# Patient Record
Sex: FEMALE | Race: OTHER | URBAN - METROPOLITAN AREA
[De-identification: names, ages, dates, MRNs, and addresses within clinical notes are randomized per-mention and may not be internally consistent; named-entity substitution may affect disease eponyms.]

---

## 2023-02-01 ENCOUNTER — LAB ENCOUNTER (OUTPATIENT)
Dept: LAB | Age: 33
End: 2023-02-01
Attending: PHYSICIAN ASSISTANT
Payer: COMMERCIAL

## 2023-02-01 ENCOUNTER — OFFICE VISIT (OUTPATIENT)
Dept: INTERNAL MEDICINE CLINIC | Facility: CLINIC | Age: 33
End: 2023-02-01

## 2023-02-01 VITALS
OXYGEN SATURATION: 99 % | DIASTOLIC BLOOD PRESSURE: 68 MMHG | BODY MASS INDEX: 26.22 KG/M2 | SYSTOLIC BLOOD PRESSURE: 90 MMHG | HEART RATE: 78 BPM | HEIGHT: 63 IN | WEIGHT: 148 LBS

## 2023-02-01 DIAGNOSIS — Z00.00 PHYSICAL EXAM: ICD-10-CM

## 2023-02-01 DIAGNOSIS — R53.83 OTHER FATIGUE: ICD-10-CM

## 2023-02-01 DIAGNOSIS — Z00.00 PHYSICAL EXAM: Primary | ICD-10-CM

## 2023-02-01 LAB
ALBUMIN SERPL-MCNC: 4.3 G/DL (ref 3.4–5)
ALBUMIN/GLOB SERPL: 1.2 {RATIO} (ref 1–2)
ALP LIVER SERPL-CCNC: 53 U/L
ALT SERPL-CCNC: 24 U/L
ANION GAP SERPL CALC-SCNC: 5 MMOL/L (ref 0–18)
AST SERPL-CCNC: 24 U/L (ref 15–37)
BASOPHILS # BLD AUTO: 0.06 X10(3) UL (ref 0–0.2)
BASOPHILS NFR BLD AUTO: 1 %
BILIRUB SERPL-MCNC: 0.7 MG/DL (ref 0.1–2)
BILIRUB UR QL: NEGATIVE
BUN BLD-MCNC: 13 MG/DL (ref 7–18)
BUN/CREAT SERPL: 14.1 (ref 10–20)
CALCIUM BLD-MCNC: 9.9 MG/DL (ref 8.5–10.1)
CHLORIDE SERPL-SCNC: 106 MMOL/L (ref 98–112)
CHOLEST SERPL-MCNC: 169 MG/DL (ref ?–200)
CO2 SERPL-SCNC: 29 MMOL/L (ref 21–32)
COLOR UR: YELLOW
CREAT BLD-MCNC: 0.92 MG/DL
DEPRECATED HBV CORE AB SER IA-ACNC: 27.9 NG/ML
DEPRECATED RDW RBC AUTO: 42.5 FL (ref 35.1–46.3)
EOSINOPHIL # BLD AUTO: 0.11 X10(3) UL (ref 0–0.7)
EOSINOPHIL NFR BLD AUTO: 1.9 %
ERYTHROCYTE [DISTWIDTH] IN BLOOD BY AUTOMATED COUNT: 12.6 % (ref 11–15)
FASTING PATIENT LIPID ANSWER: YES
FASTING STATUS PATIENT QL REPORTED: YES
GFR SERPLBLD BASED ON 1.73 SQ M-ARVRAT: 84 ML/MIN/1.73M2 (ref 60–?)
GLOBULIN PLAS-MCNC: 3.7 G/DL (ref 2.8–4.4)
GLUCOSE BLD-MCNC: 82 MG/DL (ref 70–99)
GLUCOSE UR-MCNC: NEGATIVE MG/DL
HCT VFR BLD AUTO: 44.9 %
HDLC SERPL-MCNC: 68 MG/DL (ref 40–59)
HGB BLD-MCNC: 15 G/DL
IMM GRANULOCYTES # BLD AUTO: 0.01 X10(3) UL (ref 0–1)
IMM GRANULOCYTES NFR BLD: 0.2 %
IRON SATN MFR SERPL: 19 %
IRON SERPL-MCNC: 76 UG/DL
KETONES UR-MCNC: NEGATIVE MG/DL
LDLC SERPL CALC-MCNC: 91 MG/DL (ref ?–100)
LEUKOCYTE ESTERASE UR QL STRIP.AUTO: NEGATIVE
LYMPHOCYTES # BLD AUTO: 2.2 X10(3) UL (ref 1–4)
LYMPHOCYTES NFR BLD AUTO: 38.3 %
MCH RBC QN AUTO: 30.6 PG (ref 26–34)
MCHC RBC AUTO-ENTMCNC: 33.4 G/DL (ref 31–37)
MCV RBC AUTO: 91.6 FL
MONOCYTES # BLD AUTO: 0.44 X10(3) UL (ref 0.1–1)
MONOCYTES NFR BLD AUTO: 7.7 %
NEUTROPHILS # BLD AUTO: 2.92 X10 (3) UL (ref 1.5–7.7)
NEUTROPHILS # BLD AUTO: 2.92 X10(3) UL (ref 1.5–7.7)
NEUTROPHILS NFR BLD AUTO: 50.9 %
NITRITE UR QL STRIP.AUTO: NEGATIVE
NONHDLC SERPL-MCNC: 101 MG/DL (ref ?–130)
OSMOLALITY SERPL CALC.SUM OF ELEC: 289 MOSM/KG (ref 275–295)
PH UR: 6 [PH] (ref 5–8)
PLATELET # BLD AUTO: 277 10(3)UL (ref 150–450)
POTASSIUM SERPL-SCNC: 4.6 MMOL/L (ref 3.5–5.1)
PROT SERPL-MCNC: 8 G/DL (ref 6.4–8.2)
PROT UR-MCNC: 30 MG/DL
RBC # BLD AUTO: 4.9 X10(6)UL
SODIUM SERPL-SCNC: 140 MMOL/L (ref 136–145)
SP GR UR STRIP: 1.02 (ref 1–1.03)
TIBC SERPL-MCNC: 390 UG/DL (ref 240–450)
TRANSFERRIN SERPL-MCNC: 262 MG/DL (ref 200–360)
TRIGL SERPL-MCNC: 48 MG/DL (ref 30–149)
TSI SER-ACNC: 1.37 MIU/ML (ref 0.36–3.74)
UROBILINOGEN UR STRIP-ACNC: <2
VIT C UR-MCNC: NEGATIVE MG/DL
VIT D+METAB SERPL-MCNC: 31.8 NG/ML (ref 30–100)
VLDLC SERPL CALC-MCNC: 8 MG/DL (ref 0–30)
WBC # BLD AUTO: 5.7 X10(3) UL (ref 4–11)

## 2023-02-01 PROCEDURE — 99385 PREV VISIT NEW AGE 18-39: CPT | Performed by: PHYSICIAN ASSISTANT

## 2023-02-01 PROCEDURE — 3008F BODY MASS INDEX DOCD: CPT | Performed by: PHYSICIAN ASSISTANT

## 2023-02-01 PROCEDURE — 80061 LIPID PANEL: CPT

## 2023-02-01 PROCEDURE — 85025 COMPLETE CBC W/AUTO DIFF WBC: CPT

## 2023-02-01 PROCEDURE — 82728 ASSAY OF FERRITIN: CPT

## 2023-02-01 PROCEDURE — 83540 ASSAY OF IRON: CPT

## 2023-02-01 PROCEDURE — 81001 URINALYSIS AUTO W/SCOPE: CPT

## 2023-02-01 PROCEDURE — 3074F SYST BP LT 130 MM HG: CPT | Performed by: PHYSICIAN ASSISTANT

## 2023-02-01 PROCEDURE — 84443 ASSAY THYROID STIM HORMONE: CPT

## 2023-02-01 PROCEDURE — 84466 ASSAY OF TRANSFERRIN: CPT

## 2023-02-01 PROCEDURE — 80053 COMPREHEN METABOLIC PANEL: CPT

## 2023-02-01 PROCEDURE — 36415 COLL VENOUS BLD VENIPUNCTURE: CPT

## 2023-02-01 PROCEDURE — 3078F DIAST BP <80 MM HG: CPT | Performed by: PHYSICIAN ASSISTANT

## 2023-02-01 PROCEDURE — 82306 VITAMIN D 25 HYDROXY: CPT

## 2023-02-01 RX ORDER — BUSPIRONE HYDROCHLORIDE 5 MG/1
5 TABLET ORAL 3 TIMES DAILY
COMMUNITY
Start: 2022-09-28

## 2023-02-01 RX ORDER — BUSPIRONE HYDROCHLORIDE 15 MG/1
TABLET ORAL
COMMUNITY
Start: 2023-01-30

## 2023-02-03 ENCOUNTER — PATIENT MESSAGE (OUTPATIENT)
Dept: INTERNAL MEDICINE CLINIC | Facility: CLINIC | Age: 33
End: 2023-02-03

## 2023-02-03 ENCOUNTER — OFFICE VISIT (OUTPATIENT)
Dept: DERMATOLOGY CLINIC | Facility: CLINIC | Age: 33
End: 2023-02-03

## 2023-02-03 DIAGNOSIS — D23.9 DERMATOFIBROMA: Primary | ICD-10-CM

## 2023-02-03 DIAGNOSIS — D23.9 BENIGN NEOPLASM OF SKIN, UNSPECIFIED LOCATION: ICD-10-CM

## 2023-02-03 DIAGNOSIS — L21.9 SEBORRHEIC DERMATITIS: ICD-10-CM

## 2023-02-03 DIAGNOSIS — D22.9 MULTIPLE NEVI: ICD-10-CM

## 2023-02-03 PROCEDURE — 99203 OFFICE O/P NEW LOW 30 MIN: CPT | Performed by: DERMATOLOGY

## 2023-02-03 RX ORDER — KETOCONAZOLE 20 MG/ML
SHAMPOO TOPICAL
Qty: 120 ML | Refills: 11 | Status: SHIPPED | OUTPATIENT
Start: 2023-02-03

## 2023-02-03 RX ORDER — MULTIVITAMIN
1 TABLET ORAL DAILY
COMMUNITY

## 2023-02-03 NOTE — TELEPHONE ENCOUNTER
From: Alisha Tabares  To: Miladis Angeles PA-C  Sent: 2/3/2023 11:08 AM CST  Subject: test results    Hi Skye,     I saw a few things come up on my urine sample. I am guessing it's a non issue but wanted to double check on all of my test results. Thank you!    Giovanni Rater

## 2023-02-06 DIAGNOSIS — R80.9 PROTEINURIA, UNSPECIFIED TYPE: Primary | ICD-10-CM

## 2023-02-20 ENCOUNTER — PATIENT MESSAGE (OUTPATIENT)
Dept: INTERNAL MEDICINE CLINIC | Facility: CLINIC | Age: 33
End: 2023-02-20

## 2023-02-20 DIAGNOSIS — R82.81 PYURIA: Primary | ICD-10-CM

## 2023-02-22 ENCOUNTER — LAB ENCOUNTER (OUTPATIENT)
Dept: LAB | Age: 33
End: 2023-02-22
Attending: PHYSICIAN ASSISTANT
Payer: COMMERCIAL

## 2023-02-22 DIAGNOSIS — R80.9 PROTEINURIA, UNSPECIFIED TYPE: ICD-10-CM

## 2023-02-22 LAB
BILIRUB UR QL: NEGATIVE
CLARITY UR: CLEAR
GLUCOSE UR-MCNC: NORMAL MG/DL
HGB UR QL STRIP.AUTO: NEGATIVE
KETONES UR-MCNC: NEGATIVE MG/DL
LEUKOCYTE ESTERASE UR QL STRIP.AUTO: 25
NITRITE UR QL STRIP.AUTO: NEGATIVE
PH UR: 7 [PH] (ref 5–8)
PROT UR-MCNC: NEGATIVE MG/DL
SP GR UR STRIP: 1.01 (ref 1–1.03)
UROBILINOGEN UR STRIP-ACNC: NORMAL

## 2023-02-22 PROCEDURE — 81001 URINALYSIS AUTO W/SCOPE: CPT

## 2023-02-24 ENCOUNTER — PATIENT MESSAGE (OUTPATIENT)
Dept: INTERNAL MEDICINE CLINIC | Facility: CLINIC | Age: 33
End: 2023-02-24

## 2023-02-24 NOTE — TELEPHONE ENCOUNTER
I can order a UA/CS for pt to have done, she should stay well hydrated.  If shes late for her cycle, I recommend taking a pregnancy test

## 2023-03-01 ENCOUNTER — MED REC SCAN ONLY (OUTPATIENT)
Dept: INTERNAL MEDICINE CLINIC | Facility: CLINIC | Age: 33
End: 2023-03-01

## 2023-03-01 ENCOUNTER — OFFICE VISIT (OUTPATIENT)
Dept: INTERNAL MEDICINE CLINIC | Facility: CLINIC | Age: 33
End: 2023-03-01

## 2023-03-01 ENCOUNTER — LAB ENCOUNTER (OUTPATIENT)
Dept: LAB | Age: 33
End: 2023-03-01
Attending: PHYSICIAN ASSISTANT
Payer: COMMERCIAL

## 2023-03-01 VITALS
BODY MASS INDEX: 26.4 KG/M2 | TEMPERATURE: 98 F | HEIGHT: 63 IN | HEART RATE: 64 BPM | WEIGHT: 149 LBS | OXYGEN SATURATION: 98 % | DIASTOLIC BLOOD PRESSURE: 70 MMHG | SYSTOLIC BLOOD PRESSURE: 110 MMHG

## 2023-03-01 DIAGNOSIS — R53.83 OTHER FATIGUE: ICD-10-CM

## 2023-03-01 DIAGNOSIS — R53.83 OTHER FATIGUE: Primary | ICD-10-CM

## 2023-03-01 LAB
BILIRUB UR QL: NEGATIVE
CLARITY UR: CLEAR
COLOR UR: YELLOW
CRP SERPL-MCNC: <0.29 MG/DL (ref ?–0.3)
ERYTHROCYTE [SEDIMENTATION RATE] IN BLOOD: 2 MM/HR
GLUCOSE UR-MCNC: NEGATIVE MG/DL
LEUKOCYTE ESTERASE UR QL STRIP.AUTO: NEGATIVE
NITRITE UR QL STRIP.AUTO: NEGATIVE
PH UR: 7 [PH] (ref 5–8)
PROT UR-MCNC: NEGATIVE MG/DL
SP GR UR STRIP: 1.01 (ref 1–1.03)
T3FREE SERPL-MCNC: 2.46 PG/ML (ref 2.4–4.2)
T4 SERPL-MCNC: 7.1 UG/DL
THYROPEROXIDASE AB SERPL-ACNC: 494 U/ML (ref ?–60)
TSI SER-ACNC: 1.57 MIU/ML (ref 0.36–3.74)
UROBILINOGEN UR STRIP-ACNC: <2
VIT C UR-MCNC: NEGATIVE MG/DL

## 2023-03-01 PROCEDURE — 86038 ANTINUCLEAR ANTIBODIES: CPT

## 2023-03-01 PROCEDURE — 84436 ASSAY OF TOTAL THYROXINE: CPT

## 2023-03-01 PROCEDURE — 84481 FREE ASSAY (FT-3): CPT

## 2023-03-01 PROCEDURE — 3008F BODY MASS INDEX DOCD: CPT | Performed by: PHYSICIAN ASSISTANT

## 2023-03-01 PROCEDURE — 3074F SYST BP LT 130 MM HG: CPT | Performed by: PHYSICIAN ASSISTANT

## 2023-03-01 PROCEDURE — 36415 COLL VENOUS BLD VENIPUNCTURE: CPT

## 2023-03-01 PROCEDURE — 81001 URINALYSIS AUTO W/SCOPE: CPT | Performed by: PHYSICIAN ASSISTANT

## 2023-03-01 PROCEDURE — 86140 C-REACTIVE PROTEIN: CPT

## 2023-03-01 PROCEDURE — 85652 RBC SED RATE AUTOMATED: CPT

## 2023-03-01 PROCEDURE — 86225 DNA ANTIBODY NATIVE: CPT

## 2023-03-01 PROCEDURE — 84443 ASSAY THYROID STIM HORMONE: CPT

## 2023-03-01 PROCEDURE — 86376 MICROSOMAL ANTIBODY EACH: CPT

## 2023-03-01 PROCEDURE — 3078F DIAST BP <80 MM HG: CPT | Performed by: PHYSICIAN ASSISTANT

## 2023-03-01 PROCEDURE — 99214 OFFICE O/P EST MOD 30 MIN: CPT | Performed by: PHYSICIAN ASSISTANT

## 2023-03-02 LAB
DSDNA IGG SERPL IA-ACNC: <0.6 IU/ML
ENA AB SER QL IA: 0.1 UG/L
ENA AB SER QL IA: NEGATIVE

## 2023-03-06 ENCOUNTER — TELEPHONE (OUTPATIENT)
Dept: INTERNAL MEDICINE CLINIC | Facility: CLINIC | Age: 33
End: 2023-03-06

## 2023-03-06 NOTE — TELEPHONE ENCOUNTER
Skye Maynard, please advise    Patient is calling regarding lab test results completed on 3/1/23. Per chart review, PCP has not reviewed.   Last OV 3/1/23

## 2023-04-11 PROBLEM — F41.9 ANXIETY: Status: ACTIVE | Noted: 2023-04-11

## 2023-04-11 PROBLEM — R61 NIGHT SWEATS: Status: ACTIVE | Noted: 2023-04-11

## 2023-04-11 PROBLEM — R76.8 THYROID ANTIBODY POSITIVE: Status: ACTIVE | Noted: 2023-04-11

## 2023-04-20 ENCOUNTER — PATIENT MESSAGE (OUTPATIENT)
Dept: ENDOCRINOLOGY CLINIC | Facility: CLINIC | Age: 33
End: 2023-04-20

## 2023-04-21 ENCOUNTER — OFFICE VISIT (OUTPATIENT)
Dept: FAMILY MEDICINE CLINIC | Facility: CLINIC | Age: 33
End: 2023-04-21
Payer: COMMERCIAL

## 2023-04-21 VITALS
SYSTOLIC BLOOD PRESSURE: 114 MMHG | OXYGEN SATURATION: 98 % | TEMPERATURE: 98 F | RESPIRATION RATE: 18 BRPM | HEART RATE: 67 BPM | WEIGHT: 140 LBS | BODY MASS INDEX: 23.9 KG/M2 | HEIGHT: 64 IN | DIASTOLIC BLOOD PRESSURE: 70 MMHG

## 2023-04-21 DIAGNOSIS — J02.9 SORE THROAT: Primary | ICD-10-CM

## 2023-04-21 LAB
CONTROL LINE PRESENT WITH A CLEAR BACKGROUND (YES/NO): YES YES/NO
KIT LOT #: NORMAL NUMERIC
STREP GRP A CUL-SCR: NEGATIVE

## 2023-04-21 PROCEDURE — 99202 OFFICE O/P NEW SF 15 MIN: CPT | Performed by: NURSE PRACTITIONER

## 2023-04-21 PROCEDURE — 3008F BODY MASS INDEX DOCD: CPT | Performed by: NURSE PRACTITIONER

## 2023-04-21 PROCEDURE — 87147 CULTURE TYPE IMMUNOLOGIC: CPT | Performed by: NURSE PRACTITIONER

## 2023-04-21 PROCEDURE — 87081 CULTURE SCREEN ONLY: CPT | Performed by: NURSE PRACTITIONER

## 2023-04-21 PROCEDURE — 3074F SYST BP LT 130 MM HG: CPT | Performed by: NURSE PRACTITIONER

## 2023-04-21 PROCEDURE — 87880 STREP A ASSAY W/OPTIC: CPT | Performed by: NURSE PRACTITIONER

## 2023-04-21 PROCEDURE — 3078F DIAST BP <80 MM HG: CPT | Performed by: NURSE PRACTITIONER

## 2023-04-25 NOTE — TELEPHONE ENCOUNTER
Dr. Deo Block    Please advise    Component      Latest Ref Rng 4/11/2023   T4,Free (Direct)      0.8 - 1.7 ng/dL 1.0    TSH      0.358 - 3.740 mIU/mL 1.610    NORMETANEPHRINE      0.0 - 210.1 pg/mL 99.0    METANEPHRINE      0.0 - 88.0 pg/mL 57.5    Cortisol      ug/dL 7.5    ACTH      7.2 - 63.3 pg/mL 20.0    Estradiol      No established range for female sex pg/mL 61.5    FSH      No established range for female sex mIU/mL 9.1

## 2023-04-25 NOTE — TELEPHONE ENCOUNTER
From: Francisco J Wray  To: Audra Cotton MD  Sent: 4/20/2023 7:37 PM CDT  Subject: Test Results    Hi there - checking in on my test results. Thank you!

## 2023-07-17 ENCOUNTER — OFFICE VISIT (OUTPATIENT)
Dept: INTERNAL MEDICINE CLINIC | Facility: CLINIC | Age: 33
End: 2023-07-17

## 2023-07-17 ENCOUNTER — PATIENT MESSAGE (OUTPATIENT)
Dept: INTERNAL MEDICINE CLINIC | Facility: CLINIC | Age: 33
End: 2023-07-17

## 2023-07-17 VITALS
HEART RATE: 71 BPM | DIASTOLIC BLOOD PRESSURE: 65 MMHG | HEIGHT: 64 IN | BODY MASS INDEX: 24.72 KG/M2 | SYSTOLIC BLOOD PRESSURE: 100 MMHG | WEIGHT: 144.81 LBS

## 2023-07-17 DIAGNOSIS — F90.2 ATTENTION DEFICIT HYPERACTIVITY DISORDER (ADHD), COMBINED TYPE: ICD-10-CM

## 2023-07-17 DIAGNOSIS — R41.840 ATTENTION AND CONCENTRATION DEFICIT: ICD-10-CM

## 2023-07-17 DIAGNOSIS — R41.840 ATTENTION AND CONCENTRATION DEFICIT: Primary | ICD-10-CM

## 2023-07-17 PROBLEM — F90.9 ADHD: Status: ACTIVE | Noted: 2023-07-17

## 2023-07-17 PROCEDURE — 99213 OFFICE O/P EST LOW 20 MIN: CPT | Performed by: NURSE PRACTITIONER

## 2023-07-17 PROCEDURE — 3078F DIAST BP <80 MM HG: CPT | Performed by: NURSE PRACTITIONER

## 2023-07-17 PROCEDURE — 3008F BODY MASS INDEX DOCD: CPT | Performed by: NURSE PRACTITIONER

## 2023-07-17 PROCEDURE — 3074F SYST BP LT 130 MM HG: CPT | Performed by: NURSE PRACTITIONER

## 2023-07-17 RX ORDER — DEXTROAMPHETAMINE SACCHARATE, AMPHETAMINE ASPARTATE MONOHYDRATE, DEXTROAMPHETAMINE SULFATE AND AMPHETAMINE SULFATE 3.75; 3.75; 3.75; 3.75 MG/1; MG/1; MG/1; MG/1
15 CAPSULE, EXTENDED RELEASE ORAL DAILY
Qty: 30 CAPSULE | Refills: 0 | Status: SHIPPED | OUTPATIENT
Start: 2023-07-17 | End: 2023-08-16

## 2023-07-17 RX ORDER — DEXTROAMPHETAMINE SACCHARATE, AMPHETAMINE ASPARTATE MONOHYDRATE, DEXTROAMPHETAMINE SULFATE AND AMPHETAMINE SULFATE 3.75; 3.75; 3.75; 3.75 MG/1; MG/1; MG/1; MG/1
15 CAPSULE, EXTENDED RELEASE ORAL DAILY
Qty: 30 CAPSULE | Refills: 0 | Status: SHIPPED | OUTPATIENT
Start: 2023-09-17 | End: 2023-10-17

## 2023-07-17 RX ORDER — DEXTROAMPHETAMINE SACCHARATE, AMPHETAMINE ASPARTATE MONOHYDRATE, DEXTROAMPHETAMINE SULFATE AND AMPHETAMINE SULFATE 3.75; 3.75; 3.75; 3.75 MG/1; MG/1; MG/1; MG/1
15 CAPSULE, EXTENDED RELEASE ORAL DAILY
Qty: 30 CAPSULE | Refills: 0 | Status: SHIPPED | OUTPATIENT
Start: 2023-08-17 | End: 2023-09-16

## 2023-07-17 NOTE — ASSESSMENT & PLAN NOTE
Patient with a history of ADHD. She is feeling mentally exhausted    She is requesting to get back on the Adderall. Plan  Amphetamine-Dextroamphet ER (ADDERALL XR) 15 MG Oral Capsule SR 24 Hr Take 1 capsule (15 mg total) by mouth daily. 30 capsule 0   [START ON 8/17/2023] Amphetamine-Dextroamphet ER (ADDERALL XR) 15 MG Oral Capsule SR 24 Hr Take 1 capsule (15 mg total) by mouth daily. Fill prescription in 30 days. 30 capsule 0   [START ON 9/17/2023] Amphetamine-Dextroamphet ER (ADDERALL XR) 15 MG Oral Capsule SR 24 Hr Take 1 capsule (15 mg total) by mouth daily. Fill prescription in 61 days.  30 capsule 0

## 2023-07-18 ENCOUNTER — TELEPHONE (OUTPATIENT)
Dept: DERMATOLOGY CLINIC | Facility: CLINIC | Age: 33
End: 2023-07-18

## 2023-07-18 ENCOUNTER — OFFICE VISIT (OUTPATIENT)
Dept: DERMATOLOGY CLINIC | Facility: CLINIC | Age: 33
End: 2023-07-18

## 2023-07-18 DIAGNOSIS — L70.0 ACNE VULGARIS: Primary | ICD-10-CM

## 2023-07-18 PROCEDURE — 99213 OFFICE O/P EST LOW 20 MIN: CPT | Performed by: PHYSICIAN ASSISTANT

## 2023-07-18 RX ORDER — DEXTROAMPHETAMINE SACCHARATE, AMPHETAMINE ASPARTATE MONOHYDRATE, DEXTROAMPHETAMINE SULFATE AND AMPHETAMINE SULFATE 3.75; 3.75; 3.75; 3.75 MG/1; MG/1; MG/1; MG/1
15 CAPSULE, EXTENDED RELEASE ORAL DAILY
Qty: 30 CAPSULE | Refills: 0
Start: 2023-09-17 | End: 2023-10-17

## 2023-07-18 RX ORDER — DEXTROAMPHETAMINE SACCHARATE, AMPHETAMINE ASPARTATE MONOHYDRATE, DEXTROAMPHETAMINE SULFATE AND AMPHETAMINE SULFATE 3.75; 3.75; 3.75; 3.75 MG/1; MG/1; MG/1; MG/1
15 CAPSULE, EXTENDED RELEASE ORAL DAILY
Qty: 30 CAPSULE | Refills: 0
Start: 2023-07-18 | End: 2023-08-17

## 2023-07-18 RX ORDER — DEXTROAMPHETAMINE SACCHARATE, AMPHETAMINE ASPARTATE MONOHYDRATE, DEXTROAMPHETAMINE SULFATE AND AMPHETAMINE SULFATE 3.75; 3.75; 3.75; 3.75 MG/1; MG/1; MG/1; MG/1
15 CAPSULE, EXTENDED RELEASE ORAL DAILY
Qty: 30 CAPSULE | Refills: 0
Start: 2023-08-17 | End: 2023-09-16

## 2023-07-18 RX ORDER — CLINDAMYCIN AND BENZOYL PEROXIDE 10; 50 MG/G; MG/G
GEL TOPICAL
Qty: 50 G | Refills: 3 | Status: SHIPPED | OUTPATIENT
Start: 2023-07-18

## 2023-07-18 NOTE — TELEPHONE ENCOUNTER
From: Rafael Mcdonald  To: CARLENE Clark  Sent: 7/17/2023 2:04 PM CDT  Subject: Adderall Prescription Pharmacy Not able to fill     Hi there - my pharmacy does not have Adderall in stock and might not get any until early August. Can we have this prescription please sent to the [facility] across the street instead? The address is: [address], [facility], [place] for the trouble. Thank you!

## 2023-07-18 NOTE — TELEPHONE ENCOUNTER
[facility] called    Asking to change rx.  2% benzoyl peroxide Please call      Clindamycin Phos-Benzoyl Perox 1-5 % External Gel

## 2023-07-18 NOTE — PROGRESS NOTES
HPI:    Patient ID: Darby Nuñez is a 28year old female. Patient presents for follow-up visit. Upper back still having breakouts of acne. Increased aldactone to 100 mg and would like to discuss treatment. No draining or tenderness noted. Hx of allergies to medications noted. She has had acne issues the past few months. She is able to tolerate the 100 mg at least for the past week. Review of Systems   Constitutional:  Negative for chills and fever. Musculoskeletal:  Negative for arthralgias and myalgias. Skin:  Positive for rash. Negative for color change and wound. Current Outpatient Medications   Medication Sig Dispense Refill    Amphetamine-Dextroamphet ER (ADDERALL XR) 15 MG Oral Capsule SR 24 Hr Take 1 capsule (15 mg total) by mouth daily. 30 capsule 0    [START ON 8/17/2023] Amphetamine-Dextroamphet ER (ADDERALL XR) 15 MG Oral Capsule SR 24 Hr Take 1 capsule (15 mg total) by mouth daily. Fill prescription in 30 days. 30 capsule 0    [START ON 9/17/2023] Amphetamine-Dextroamphet ER (ADDERALL XR) 15 MG Oral Capsule SR 24 Hr Take 1 capsule (15 mg total) by mouth daily. Fill prescription in 61 days. 30 capsule 0    spironolactone 25 MG Oral Tab 1 tablet p.o. daily X 3 days, 2 p.o. daily X 3 days, 3 p.o. daily 90 tablet 3    Multiple Vitamin (ONE-DAILY MULTI VITAMINS) Oral Tab Take 1 tablet by mouth daily. ketoconazole 2 % External Shampoo Apply to scalp 2 times per week. 120 mL 11    busPIRone 15 MG Oral Tab        Allergies:  Sulfa Antibiotics       ANAPHYLAXIS, SWELLING    Comment:Throat closing   LMP 06/29/2023 (Exact Date)   There is no height or weight on file to calculate BMI. PHYSICAL EXAM:   Physical Exam  Constitutional:       General: She is not in acute distress. Appearance: Normal appearance. Skin:     General: Skin is warm and dry. Findings: Rash present. Comments: Papules noted on the back. No cystic lesions noted. No draining or tenderness noted. Slight erythema noted. Hyperpigmented areas noted on the face. Some scarring noted as well. Neurological:      Mental Status: She is alert and oriented to person, place, and time. ASSESSMENT/PLAN:   1. Acne vulgaris  -After discussion with patient, advised the following:  -Continue with aldactone 100 mg daily for 1 month to see if there is improvement  -Started tretinoin  _Educated to apply 2-3 times per day   -Started clindamycin-BPO   -Educated to apply daily in the morning.   -To call or follow-up with worsening symptoms or concerns.   -Return in 2-3 months to see if there is improvement.   -Pt was agreeable to plan and will comply with discussion above. No orders of the defined types were placed in this encounter.       Meds This Visit:  Requested Prescriptions      No prescriptions requested or ordered in this encounter       Imaging & Referrals:  None         EX#[id number]

## 2023-07-18 NOTE — TELEPHONE ENCOUNTER
Pharmacist asking to change it to Clindamycin 1.2%-5% so that pt's insurance will cover the medication.   Provided approval to the pharmacist.

## 2023-08-25 PROBLEM — U07.1 COVID-19 AFFECTING PREGNANCY IN FIRST TRIMESTER (HCC): Status: ACTIVE | Noted: 2022-01-24

## 2023-08-25 PROBLEM — O98.511 COVID-19 AFFECTING PREGNANCY IN FIRST TRIMESTER: Status: ACTIVE | Noted: 2022-01-24

## 2023-08-25 PROBLEM — U07.1 COVID-19 AFFECTING PREGNANCY IN FIRST TRIMESTER: Status: ACTIVE | Noted: 2022-01-24

## 2023-08-25 PROBLEM — O98.511 COVID-19 AFFECTING PREGNANCY IN FIRST TRIMESTER (HCC): Status: ACTIVE | Noted: 2022-01-24

## 2023-09-09 RX ORDER — SPIRONOLACTONE 25 MG/1
TABLET ORAL
Qty: 120 TABLET | Refills: 5 | Status: SHIPPED | OUTPATIENT
Start: 2023-09-09

## 2023-09-09 NOTE — TELEPHONE ENCOUNTER
Refill Request for medication(s): SPIRONOLACTONE 25 MG Oral Tab     Last Office Visit: 07/18/23    Last Refill: 05/11/23    Pharmacy, Dosage verified: [address] [facility] [id number] - [address], [address] [facility] [phone], [phone]    Condition Update (if applicable): Mycnura msg sent. Rx pended and sent to provider for approval, please advise. Thank You!

## 2023-09-21 ENCOUNTER — OFFICE VISIT (OUTPATIENT)
Dept: DERMATOLOGY CLINIC | Facility: CLINIC | Age: 33
End: 2023-09-21

## 2023-09-21 DIAGNOSIS — L70.0 ACNE VULGARIS: Primary | ICD-10-CM

## 2023-09-21 PROCEDURE — 99213 OFFICE O/P EST LOW 20 MIN: CPT | Performed by: PHYSICIAN ASSISTANT

## 2023-12-16 ENCOUNTER — PATIENT MESSAGE (OUTPATIENT)
Dept: INTERNAL MEDICINE CLINIC | Facility: CLINIC | Age: 33
End: 2023-12-16

## 2023-12-18 RX ORDER — DEXTROAMPHETAMINE SACCHARATE, AMPHETAMINE ASPARTATE MONOHYDRATE, DEXTROAMPHETAMINE SULFATE AND AMPHETAMINE SULFATE 3.75; 3.75; 3.75; 3.75 MG/1; MG/1; MG/1; MG/1
15 CAPSULE, EXTENDED RELEASE ORAL DAILY
Qty: 30 CAPSULE | Refills: 0 | Status: SHIPPED | OUTPATIENT
Start: 2023-12-18 | End: 2024-01-17

## 2023-12-18 NOTE — TELEPHONE ENCOUNTER
From: Denice Toney  To: Laura Hager  Sent: 12/16/2023 11:25 PM CST  Subject: Prescription refill    Hi there my adderal prescription refills has ended. Are you able to send refills to my pharmacy or do I need to come in for a follow up visit. Thank you!

## 2024-02-07 ENCOUNTER — LAB REQUISITION (OUTPATIENT)
Dept: LAB | Age: 34
End: 2024-02-07

## 2024-02-07 DIAGNOSIS — Z13.818 ENCOUNTER FOR SCREENING FOR OTHER DIGESTIVE SYSTEM DISORDERS: ICD-10-CM

## 2024-02-07 DIAGNOSIS — Z13.21 ENCOUNTER FOR SCREENING FOR NUTRITIONAL DISORDER: ICD-10-CM

## 2024-02-07 DIAGNOSIS — Z80.8 FAMILY HISTORY OF MALIGNANT NEOPLASM OF OTHER ORGANS OR SYSTEMS: ICD-10-CM

## 2024-02-07 DIAGNOSIS — Z83.3 FAMILY HISTORY OF DIABETES MELLITUS: ICD-10-CM

## 2024-02-07 DIAGNOSIS — F34.1 DYSTHYMIC DISORDER: ICD-10-CM

## 2024-02-07 DIAGNOSIS — N92.6 IRREGULAR MENSTRUATION, UNSPECIFIED: ICD-10-CM

## 2024-02-07 DIAGNOSIS — Z00.01 ENCOUNTER FOR GENERAL ADULT MEDICAL EXAMINATION WITH ABNORMAL FINDINGS: ICD-10-CM

## 2024-02-07 DIAGNOSIS — Z79.899 OTHER LONG TERM (CURRENT) DRUG THERAPY: ICD-10-CM

## 2024-02-07 DIAGNOSIS — Z13.220 ENCOUNTER FOR SCREENING FOR LIPOID DISORDERS: ICD-10-CM

## 2024-02-07 DIAGNOSIS — Z13.29 ENCOUNTER FOR SCREENING FOR OTHER SUSPECTED ENDOCRINE DISORDER: ICD-10-CM

## 2024-02-07 DIAGNOSIS — Z83.49 FAMILY HISTORY OF OTHER ENDOCRINE, NUTRITIONAL AND METABOLIC DISEASES: ICD-10-CM

## 2024-02-07 DIAGNOSIS — Z13.228 ENCOUNTER FOR SCREENING FOR OTHER METABOLIC DISORDERS: ICD-10-CM

## 2024-02-09 PROCEDURE — PSEU8135 LIPID PANEL WITH REFLEX: Performed by: CLINICAL MEDICAL LABORATORY

## 2024-02-09 PROCEDURE — PSEU8115 INSULIN LEVEL, FASTING: Performed by: CLINICAL MEDICAL LABORATORY

## 2024-02-09 PROCEDURE — 86376 MICROSOMAL ANTIBODY EACH: CPT | Performed by: CLINICAL MEDICAL LABORATORY

## 2024-02-09 PROCEDURE — PSEU10185 TESTOSTERONE, TOTAL, FREE AND BIOAVAILABLE, MALE: Performed by: CLINICAL MEDICAL LABORATORY

## 2024-02-09 PROCEDURE — 84439 ASSAY OF FREE THYROXINE: CPT | Performed by: CLINICAL MEDICAL LABORATORY

## 2024-02-09 PROCEDURE — PSEU8259 FERRITIN: Performed by: CLINICAL MEDICAL LABORATORY

## 2024-02-09 PROCEDURE — 84480 ASSAY TRIIODOTHYRONINE (T3): CPT | Performed by: CLINICAL MEDICAL LABORATORY

## 2024-02-09 PROCEDURE — 84630 ASSAY OF ZINC: CPT | Performed by: CLINICAL MEDICAL LABORATORY

## 2024-02-09 PROCEDURE — 83001 ASSAY OF GONADOTROPIN (FSH): CPT | Performed by: CLINICAL MEDICAL LABORATORY

## 2024-02-09 PROCEDURE — PSEU8258 ESTRADIOL: Performed by: CLINICAL MEDICAL LABORATORY

## 2024-02-09 PROCEDURE — 84481 FREE ASSAY (FT-3): CPT | Performed by: CLINICAL MEDICAL LABORATORY

## 2024-02-09 PROCEDURE — PSEU8303 URIC ACID: Performed by: CLINICAL MEDICAL LABORATORY

## 2024-02-09 PROCEDURE — 83735 ASSAY OF MAGNESIUM: CPT | Performed by: CLINICAL MEDICAL LABORATORY

## 2024-02-09 PROCEDURE — PSEU8187 CORTISOL: Performed by: CLINICAL MEDICAL LABORATORY

## 2024-02-09 PROCEDURE — 83036 HEMOGLOBIN GLYCOSYLATED A1C: CPT | Performed by: CLINICAL MEDICAL LABORATORY

## 2024-02-09 PROCEDURE — 83002 ASSAY OF GONADOTROPIN (LH): CPT | Performed by: CLINICAL MEDICAL LABORATORY

## 2024-02-09 PROCEDURE — 84403 ASSAY OF TOTAL TESTOSTERONE: CPT | Performed by: CLINICAL MEDICAL LABORATORY

## 2024-02-09 PROCEDURE — 82977 ASSAY OF GGT: CPT | Performed by: CLINICAL MEDICAL LABORATORY

## 2024-02-09 PROCEDURE — 82728 ASSAY OF FERRITIN: CPT | Performed by: CLINICAL MEDICAL LABORATORY

## 2024-02-09 PROCEDURE — 82627 DEHYDROEPIANDROSTERONE: CPT | Performed by: CLINICAL MEDICAL LABORATORY

## 2024-02-09 PROCEDURE — 83540 ASSAY OF IRON: CPT | Performed by: CLINICAL MEDICAL LABORATORY

## 2024-02-09 PROCEDURE — 80061 LIPID PANEL: CPT | Performed by: CLINICAL MEDICAL LABORATORY

## 2024-02-09 PROCEDURE — PSEU8904 ZINC: Performed by: CLINICAL MEDICAL LABORATORY

## 2024-02-09 PROCEDURE — 84550 ASSAY OF BLOOD/URIC ACID: CPT | Performed by: CLINICAL MEDICAL LABORATORY

## 2024-02-09 PROCEDURE — PSEU8088 FOLLICLE STIMULATING & LUTEINIZING HORMONES: Performed by: CLINICAL MEDICAL LABORATORY

## 2024-02-09 PROCEDURE — 84270 ASSAY OF SEX HORMONE GLOBUL: CPT | Performed by: CLINICAL MEDICAL LABORATORY

## 2024-02-09 PROCEDURE — PSEU8229 VITAMIN D -25 HYDROXY: Performed by: CLINICAL MEDICAL LABORATORY

## 2024-02-09 PROCEDURE — PSEU8223 T3, TOTAL: Performed by: CLINICAL MEDICAL LABORATORY

## 2024-02-09 PROCEDURE — 85025 COMPLETE CBC W/AUTO DIFF WBC: CPT | Performed by: CLINICAL MEDICAL LABORATORY

## 2024-02-09 PROCEDURE — 83550 IRON BINDING TEST: CPT | Performed by: CLINICAL MEDICAL LABORATORY

## 2024-02-09 PROCEDURE — 82306 VITAMIN D 25 HYDROXY: CPT | Performed by: CLINICAL MEDICAL LABORATORY

## 2024-02-09 PROCEDURE — PSEU8274 MAGNESIUM: Performed by: CLINICAL MEDICAL LABORATORY

## 2024-02-09 PROCEDURE — PSEU8262 FREE T4: Performed by: CLINICAL MEDICAL LABORATORY

## 2024-02-09 PROCEDURE — PSEU8191 DHEA SULFATE: Performed by: CLINICAL MEDICAL LABORATORY

## 2024-02-09 PROCEDURE — PSEU8263 GGT: Performed by: CLINICAL MEDICAL LABORATORY

## 2024-02-09 PROCEDURE — 80053 COMPREHEN METABOLIC PANEL: CPT | Performed by: CLINICAL MEDICAL LABORATORY

## 2024-02-09 PROCEDURE — PSEU8286 PROGESTERONE: Performed by: CLINICAL MEDICAL LABORATORY

## 2024-02-09 PROCEDURE — 82607 VITAMIN B-12: CPT | Performed by: CLINICAL MEDICAL LABORATORY

## 2024-02-09 PROCEDURE — PSEU8168 THYROID STIMULATING HORMONE REFLEX: Performed by: CLINICAL MEDICAL LABORATORY

## 2024-02-09 PROCEDURE — 86800 THYROGLOBULIN ANTIBODY: CPT | Performed by: CLINICAL MEDICAL LABORATORY

## 2024-02-09 PROCEDURE — 86141 C-REACTIVE PROTEIN HS: CPT | Performed by: CLINICAL MEDICAL LABORATORY

## 2024-02-09 PROCEDURE — PSEU8171 VITAMIN B12 AND FOLATE: Performed by: CLINICAL MEDICAL LABORATORY

## 2024-02-09 PROCEDURE — PSEU8556 THYROID ANTIBODIES: Performed by: CLINICAL MEDICAL LABORATORY

## 2024-02-09 PROCEDURE — PSEU8266 GLYCOHEMOGLOBIN: Performed by: CLINICAL MEDICAL LABORATORY

## 2024-02-09 PROCEDURE — 84144 ASSAY OF PROGESTERONE: CPT | Performed by: CLINICAL MEDICAL LABORATORY

## 2024-02-09 PROCEDURE — PSEU8506 C REACTIVE PROTEIN HIGH SENSITIVITY: Performed by: CLINICAL MEDICAL LABORATORY

## 2024-02-09 PROCEDURE — PSEU8250 COMPREHENSIVE METABOLIC PANEL: Performed by: CLINICAL MEDICAL LABORATORY

## 2024-02-09 PROCEDURE — 82533 TOTAL CORTISOL: CPT | Performed by: CLINICAL MEDICAL LABORATORY

## 2024-02-09 PROCEDURE — PSEU8196 FREE T3: Performed by: CLINICAL MEDICAL LABORATORY

## 2024-02-09 PROCEDURE — 81003 URINALYSIS AUTO W/O SCOPE: CPT | Performed by: CLINICAL MEDICAL LABORATORY

## 2024-02-09 PROCEDURE — PSEU8268 LACTATE DEHYDROGENASE: Performed by: CLINICAL MEDICAL LABORATORY

## 2024-02-09 PROCEDURE — 84443 ASSAY THYROID STIM HORMONE: CPT | Performed by: CLINICAL MEDICAL LABORATORY

## 2024-02-09 PROCEDURE — 82746 ASSAY OF FOLIC ACID SERUM: CPT | Performed by: CLINICAL MEDICAL LABORATORY

## 2024-02-09 PROCEDURE — PSEU8203 IRON AND TOTAL IRON BINDING CAPACITY: Performed by: CLINICAL MEDICAL LABORATORY

## 2024-02-09 PROCEDURE — 83615 LACTATE (LD) (LDH) ENZYME: CPT | Performed by: CLINICAL MEDICAL LABORATORY

## 2024-02-09 PROCEDURE — 83525 ASSAY OF INSULIN: CPT | Performed by: CLINICAL MEDICAL LABORATORY

## 2024-02-09 PROCEDURE — 82670 ASSAY OF TOTAL ESTRADIOL: CPT | Performed by: CLINICAL MEDICAL LABORATORY

## 2024-02-10 LAB
25(OH)D3+25(OH)D2 SERPL-MCNC: 45.1 NG/ML (ref 30–100)
ALBUMIN SERPL-MCNC: 4.8 G/DL (ref 3.6–5.1)
ALBUMIN/GLOB SERPL: 1.5 {RATIO} (ref 1–2.4)
ALP SERPL-CCNC: 43 UNITS/L (ref 45–117)
ALT SERPL-CCNC: 19 UNITS/L
ANION GAP SERPL CALC-SCNC: 11 MMOL/L (ref 7–19)
APPEARANCE UR: CLEAR
AST SERPL-CCNC: 11 UNITS/L
BASOPHILS # BLD: 0.1 K/MCL (ref 0–0.3)
BASOPHILS NFR BLD: 1 %
BILIRUB SERPL-MCNC: 0.9 MG/DL (ref 0.2–1)
BILIRUB UR QL STRIP: NEGATIVE
BUN SERPL-MCNC: 15 MG/DL (ref 6–20)
BUN/CREAT SERPL: 22 (ref 7–25)
CALCIUM SERPL-MCNC: 10.4 MG/DL (ref 8.4–10.2)
CHLORIDE SERPL-SCNC: 104 MMOL/L (ref 97–110)
CHOLEST SERPL-MCNC: 192 MG/DL
CHOLEST/HDLC SERPL: 2.5 {RATIO}
CO2 SERPL-SCNC: 25 MMOL/L (ref 21–32)
COLOR UR: COLORLESS
CORTIS SERPL-MCNC: 6.7 MCG/DL (ref 3.4–22.5)
CREAT SERPL-MCNC: 0.67 MG/DL (ref 0.51–0.95)
CRP SERPL HS-MCNC: <0.16 MG/L
DEPRECATED RDW RBC: 44.2 FL (ref 39–50)
EGFRCR SERPLBLD CKD-EPI 2021: >90 ML/MIN/{1.73_M2}
EOSINOPHIL # BLD: 0.1 K/MCL (ref 0–0.5)
EOSINOPHIL NFR BLD: 1 %
ERYTHROCYTE [DISTWIDTH] IN BLOOD: 13.1 % (ref 11–15)
ESTRADIOL SERPL-MCNC: 83 PG/ML
FASTING DURATION TIME PATIENT: ABNORMAL H
FASTING DURATION TIME PATIENT: NORMAL H
FERRITIN SERPL-MCNC: 71 NG/ML (ref 8–252)
FOLATE SERPL-MCNC: >24 NG/ML
FSH SERPL-ACNC: 6 MUNITS/ML
GGT SERPL-CCNC: 17 UNITS/L (ref 5–55)
GLOBULIN SER-MCNC: 3.2 G/DL (ref 2–4)
GLUCOSE SERPL-MCNC: 85 MG/DL (ref 70–99)
GLUCOSE UR STRIP-MCNC: NEGATIVE MG/DL
HBA1C MFR BLD: 5.1 % (ref 4.5–5.6)
HCT VFR BLD CALC: 43.8 % (ref 36–46.5)
HDLC SERPL-MCNC: 76 MG/DL
HGB BLD-MCNC: 14.7 G/DL (ref 12–15.5)
HGB UR QL STRIP: NEGATIVE
IMM GRANULOCYTES # BLD AUTO: 0 K/MCL (ref 0–0.2)
IMM GRANULOCYTES # BLD: 0 %
INSULIN P FAST SERPL-ACNC: 5 MUNITS/L (ref 3–28)
IRON SATN MFR SERPL: 22 % (ref 15–45)
IRON SERPL-MCNC: 74 MCG/DL (ref 50–170)
KETONES UR STRIP-MCNC: NEGATIVE MG/DL
LDH SERPL L TO P-CCNC: 153 UNITS/L (ref 82–240)
LDLC SERPL CALC-MCNC: 98 MG/DL
LEUKOCYTE ESTERASE UR QL STRIP: NEGATIVE
LH SERPL-ACNC: 5.9 MUNITS/ML
LYMPHOCYTES # BLD: 2.5 K/MCL (ref 1–4.8)
LYMPHOCYTES NFR BLD: 34 %
MAGNESIUM SERPL-MCNC: 2.2 MG/DL (ref 1.7–2.4)
MCH RBC QN AUTO: 30.9 PG (ref 26–34)
MCHC RBC AUTO-ENTMCNC: 33.6 G/DL (ref 32–36.5)
MCV RBC AUTO: 92 FL (ref 78–100)
MONOCYTES # BLD: 0.5 K/MCL (ref 0.3–0.9)
MONOCYTES NFR BLD: 6 %
NEUTROPHILS # BLD: 4.3 K/MCL (ref 1.8–7.7)
NEUTROPHILS NFR BLD: 58 %
NITRITE UR QL STRIP: NEGATIVE
NONHDLC SERPL-MCNC: 116 MG/DL
NRBC BLD MANUAL-RTO: 0 /100 WBC
PH UR STRIP: 6.5 [PH] (ref 5–7)
PLATELET # BLD AUTO: 248 K/MCL (ref 140–450)
POTASSIUM SERPL-SCNC: 4 MMOL/L (ref 3.4–5.1)
PROGEST SERPL-MCNC: 9.54 NG/ML
PROT SERPL-MCNC: 8 G/DL (ref 6.4–8.2)
PROT UR STRIP-MCNC: NEGATIVE MG/DL
RBC # BLD: 4.76 MIL/MCL (ref 4–5.2)
SHBG SERPL-SCNC: 72 NMOL/L (ref 30–135)
SODIUM SERPL-SCNC: 136 MMOL/L (ref 135–145)
SP GR UR STRIP: 1.01 (ref 1–1.03)
T3 SERPL-MCNC: 0.99 NG/ML (ref 0.6–1.81)
T3FREE SERPL-MCNC: 3 PG/ML (ref 2.2–4)
T4 FREE SERPL-MCNC: 1 NG/DL (ref 0.8–1.5)
T4 SERPL-MCNC: 8.7 MCG/DL (ref 4.7–13.3)
TESTOST FREE MFR SERPL: 1 %
TESTOST FREE SERPL-MCNC: 2 PG/ML
TESTOST SERPL-MCNC: 20.1 NG/DL
TESTOSTERONE.FREE+WB SERPL-MCNC: 5 NG/DL
THYROGLOB AB SERPL-ACNC: <0.9 IU/ML (ref 0–4)
THYROPEROXIDASE AB SERPL-ACNC: 467 UNITS/ML
TIBC SERPL-MCNC: 333 MCG/DL (ref 250–450)
TRIGL SERPL-MCNC: 92 MG/DL
TSH SERPL-ACNC: 1.14 MCUNITS/ML (ref 0.35–5)
URATE SERPL-MCNC: 3 MG/DL (ref 2.6–5.9)
UROBILINOGEN UR STRIP-MCNC: 0.2 MG/DL
VIT B12 SERPL-MCNC: 1149 PG/ML (ref 211–911)
WBC # BLD: 7.4 K/MCL (ref 4.2–11)

## 2024-02-12 LAB
DHEA-S SERPL-MCNC: 178.8 MCG/DL (ref 8–391)
ZINC SERPL-MCNC: 90 MCG/DL (ref 70–120)

## 2024-02-15 ENCOUNTER — LAB ENCOUNTER (OUTPATIENT)
Dept: LAB | Age: 34
End: 2024-02-15
Attending: OBSTETRICS & GYNECOLOGY
Payer: COMMERCIAL

## 2024-02-15 ENCOUNTER — OFFICE VISIT (OUTPATIENT)
Dept: OBGYN CLINIC | Facility: CLINIC | Age: 34
End: 2024-02-15

## 2024-02-15 VITALS
HEIGHT: 63 IN | BODY MASS INDEX: 22.54 KG/M2 | SYSTOLIC BLOOD PRESSURE: 110 MMHG | WEIGHT: 127.19 LBS | DIASTOLIC BLOOD PRESSURE: 72 MMHG

## 2024-02-15 DIAGNOSIS — N92.6 IRREGULAR MENSES: ICD-10-CM

## 2024-02-15 DIAGNOSIS — Z01.419 ENCOUNTER FOR WELL WOMAN EXAM WITH ROUTINE GYNECOLOGICAL EXAM: Primary | ICD-10-CM

## 2024-02-15 DIAGNOSIS — Z12.4 ENCOUNTER FOR PAPANICOLAOU SMEAR FOR CERVICAL CANCER SCREENING: ICD-10-CM

## 2024-02-15 LAB
INSULIN SERPL-ACNC: 3.5 MU/L (ref 3–25)
T4 FREE SERPL-MCNC: 1 NG/DL (ref 0.8–1.7)
TSI SER-ACNC: 1.64 MIU/ML (ref 0.36–3.74)

## 2024-02-15 PROCEDURE — 99395 PREV VISIT EST AGE 18-39: CPT | Performed by: OBSTETRICS & GYNECOLOGY

## 2024-02-15 PROCEDURE — 3074F SYST BP LT 130 MM HG: CPT | Performed by: OBSTETRICS & GYNECOLOGY

## 2024-02-15 PROCEDURE — 84443 ASSAY THYROID STIM HORMONE: CPT

## 2024-02-15 PROCEDURE — 83525 ASSAY OF INSULIN: CPT

## 2024-02-15 PROCEDURE — 84439 ASSAY OF FREE THYROXINE: CPT

## 2024-02-15 PROCEDURE — 3008F BODY MASS INDEX DOCD: CPT | Performed by: OBSTETRICS & GYNECOLOGY

## 2024-02-15 PROCEDURE — 3078F DIAST BP <80 MM HG: CPT | Performed by: OBSTETRICS & GYNECOLOGY

## 2024-02-15 RX ORDER — MAGNESIUM 200 MG
TABLET ORAL
COMMUNITY

## 2024-02-15 NOTE — PROGRESS NOTES
[facility]  Obstetrics and Gynecology  Gynecology Visit    Chief Complaint   Patient presents with    Annual           Lou Mathur is a 33 year old female who presents for Annual exam.    LMP: 2024.    Menses regular: Irregular.    Menstrual flow normal: Moderate.    Birth control or HRT:  No.   Refill n.a  Last Pap Smear: 2021.  Any history of abnormal paps: no   Last MMG: n/a  Any Medication Refills needed today?: no  Sleep: 6-8 hrs.    Diet: Balanced.    Exercise: Yes.   Screening labs/Blood work today: No.     Colonoscopy (if over 46 y/o): n/a.   Gardasil:(age 9-46 y/o) no.   Genetic Cancer screen (if indicated): no.   Flu (Aug-April): up to date.TDAP (every 10 years) 2020.      Additional Problems/concerns: patient would like to discuss irregular cycles that hs been an ongoing issue.      Next Appt:  1year    Immunization History   Administered Date(s) Administered    Covid-19 Vaccine Pfizer 30 mcg/0.3 ml 2021, 2021    HEP A/HEP B Combined 2018    Influenza 10/01/2019, 10/20/2022    MMR 2018    TDAP 2017, 2020         Current Outpatient Medications:     Magnesium 200 MG Oral Tab, Take by mouth., Disp: , Rfl:     Amphetamine-Dextroamphet ER (ADDERALL XR) 15 MG Oral Capsule SR 24 Hr, Take 1 capsule (15 mg total) by mouth daily. Fill prescription in 61 days., Disp: 30 capsule, Rfl: 0    Clindamycin Phos-Benzoyl Perox 1-5 % External Gel, Apply daily as tolerated, Disp: 50 g, Rfl: 3    tretinoin 0.025 % External Cream, Apply 1 Application topically nightly. Use as tolerated, Disp: 30 g, Rfl: 3    Multiple Vitamin (ONE-DAILY MULTI VITAMINS) Oral Tab, Take 1 tablet by mouth daily., Disp: , Rfl:     ketoconazole 2 % External Shampoo, Apply to scalp 2 times per week., Disp: 120 mL, Rfl: 11    busPIRone 15 MG Oral Tab, , Disp: , Rfl:     Allergies   Allergen Reactions    Sulfa Antibiotics ANAPHYLAXIS and SWELLING     Throat closing       OB History     Para Term  AB Living   2 2 2 0 0 2   SAB IAB Ectopic Multiple Live Births   0 0 0 0 2      # Outcome Date GA Lbr Franky/2nd Weight Sex Delivery Anes PTL Lv   2 Term 22 37w0d  6 lb 15 oz (3.147 kg) F NORMAL SPONT EPI  JEANE   1 Term 20 40w1d / 00:45 7 lb 11.5 oz (3.5 kg) M Vag-Spont EPI N JEANE      Name: STEPHIE AMAYA      Apgar1: 8  Apgar5: 9       History reviewed. No pertinent past medical history.    History reviewed. No pertinent surgical history.    Family History   Problem Relation Age of Onset    Hyperlipidemia Father     Hypertension Mother     Hypertension Maternal Grandmother     Hypertension Maternal Grandfather     Hypertension Paternal Grandmother     Hypertension Paternal Grandfather         Tobacco  Allergies  Meds  Med Hx  Surg Hx  Fam Hx  Soc Hx        Social History     Socioeconomic History    Marital status:      Spouse name: Not on file    Number of children: Not on file    Years of education: Not on file    Highest education level: Not on file   Occupational History    Not on file   Tobacco Use    Smoking status: Never     Passive exposure: Never    Smokeless tobacco: Never   Vaping Use    Vaping Use: Never used   Substance and Sexual Activity    Alcohol use: Yes     Comment: Rare    Drug use: Never    Sexual activity: Not on file   Other Topics Concern    Grew up on a farm Not Asked    History of tanning Not Asked    Outdoor occupation Not Asked    Breast feeding Not Asked    Reaction to local anesthetic No    Pt has a pacemaker No    Pt has a defibrillator No   Social History Narrative    Not on file     Social Determinants of Health     Financial Resource Strain: Not on file   Food Insecurity: Not on file   Transportation Needs: Not on file   Physical Activity: Not on file   Stress: Not on file   Social Connections: Not on file   Housing Stability: Not on file       /72   Ht 5' 3\" (1.6 m)   Wt 127 lb 3.3 oz (57.7 kg)   LMP 2024 (Exact Date)    BMI 22.53 kg/m²     Wt Readings from Last 3 Encounters:   02/15/24 127 lb 3.3 oz (57.7 kg)   08/28/23 139 lb (63 kg)   07/17/23 144 lb 12.8 oz (65.7 kg)         Health Maintenance   Topic Date Due    Influenza Vaccine (1) 08/01/2021    Screen for Cervical Cancer 11/05/2021    DTaP,Tdap and Td Vaccines (3 - Td or Tdap) 03/18/2025    Hepatitis C Screening Completed    HIV Screening Completed    COVID-19 Vaccine Completed     Review of Systems   General: Present- Feeling well. Not Present- Chills, Fever, Weight Gain and Weight Loss.  HEENT: Not Present- Headache and Sore Throat.  Respiratory: Not Present- Cough, Difficulty Breathing, Hemoptysis and Sputum Production.  Cardiovascular: Not Present- Chest Pain, Elevated Blood Pressure, Fainting / Blacking Out and Shortness of Breath.  Gastrointestinal: Not Present- Constipation, Diarrhea, Nausea and Vomiting.  Female Genitourinary: Not Present- Discharge, Dysuria and Frequency.  Musculoskeletal: Not Present- Leg Cramps and Swelling of Extremities.  Neurological: Not Present- Dizziness and Headaches.  Psychiatric: Not Present- Anxiety and Depression.  Endocrine: Not Present- Appetite Changes, Hair Changes and Thyroid Problems.  Hematology: Not Present- Easy Bruising and Excessive bleeding.  All other systems negative     Physical Exam The physical exam findings are as follows:     General   Mental Status - Alert. General Appearance - Cooperative. Orientation - Oriented X4. Build & Nutrition - Well nourished.    Head and Neck  Thyroid   Gland Characteristics - normal size and consistency.    Chest and Lung Exam   Inspection:   Chest Wall: - Normal.  Percussion:   Quality and Intensity: - Percussion normal.  Palpation: - Palpation normal.  Auscultation:   Breath sounds: - Normal.  Adventitious sounds: - No Adventitious sounds.    Breast   Nipples: Characteristics - Bilateral - Normal. Discharge - Bilateral - None.  Breast - Bilateral - Symmetric.    Cardiovascular    Auscultation: Rhythm - Regular. Heart Sounds - Normal heart sounds.  Murmurs & Other Heart Sounds: Auscultation of the heart reveals - No Murmurs.    Abdomen   Inspection: Inspection of the abdomen reveals - No Hernias. Incisional scars - no incisional scars.  Palpation/Percussion: Palpation and Percussion of the abdomen reveal - Non Tender and No Palpable abdominal masses.  Liver: - Normal.  Auscultation: Auscultation of the abdomen reveals - Bowel sounds normal.    Female Genitourinary     External Genitalia   Perineum - Normal. Bartholin's Gland - Bilateral - Normal. Clitoris - Normal.  Introitus: Characteristics - No Cystocele, Enterocele or Rectocele. Discharge - None.  Labia Majora: Lesions - Bilateral - None. Characteristics - Bilateral - Normal.  Labia Minora: Lesions - Bilateral - None. Characteristics - Bilateral - Normal.  Urethra: Characteristics - Normal. Discharge - None.  Difficult Run Gland - Bilateral - Normal.  Vulva: Characteristics - Normal. Lesions - None.    Speculum & Bimanual   Vagina:   Vaginal Wall: - Normal.  Vaginal Lesions - None. Vaginal Mucosa - Normal.  Cervix: Characteristics - No Motion tenderness. Discharge - None.  Uterus: Characteristics - Normal. Position - Midposition.  Adnexa: Characteristics - Bilateral - Normal. Masses - No Adnexal Masses.  Wet Mount: pH - 3.8-4.2. Vaginal discharge - Clear  and Thin. Amine Odor - Absent. Main patient complaints - Discharge. Microscopy - Lactobacilli and Epithelial cells.    Rectal   Anorectal Exam: External - normal external exam.    Peripheral Vascular   Upper Extremity:   Palpation: - Pulses bilaterally normal.  Lower Extremity: Inspection - Bilateral - Inspection Normal.  Palpation: Edema - Bilateral - No edema.    Neurologic   Mental Status: - Normal.    Lymphatic  General Lymphatics   Description - Normal .       1. Encounter for well woman exam with routine gynecological exam    2. Encounter for Papanicolaou smear for cervical cancer  screening  - ThinPrep PAP with HPV Reflex Request B; Future    3. Irregular menses  - TSH and Free T4; Future  - Insulin; Future

## 2024-02-22 LAB
.: NORMAL
.: NORMAL

## 2024-02-23 LAB — HPV I/H RISK 1 DNA SPEC QL NAA+PROBE: NEGATIVE

## 2024-02-28 ENCOUNTER — TELEPHONE (OUTPATIENT)
Dept: OBGYN CLINIC | Facility: CLINIC | Age: 34
End: 2024-02-28

## 2024-02-28 NOTE — TELEPHONE ENCOUNTER
Pt called to schedule appt with Dr. Ford for additional swabs. [place] Ford [place] [facility] [place].

## 2024-02-29 NOTE — TELEPHONE ENCOUNTER
Patient verified name and     Discussed availability with patient, scheduled [place]18 with [place] [facility] [place] Aware of scheduling details. Verbalized understanding and agreed.

## 2024-03-10 DIAGNOSIS — F90.2 ATTENTION DEFICIT HYPERACTIVITY DISORDER (ADHD), COMBINED TYPE: ICD-10-CM

## 2024-03-10 RX ORDER — DEXTROAMPHETAMINE SACCHARATE, AMPHETAMINE ASPARTATE MONOHYDRATE, DEXTROAMPHETAMINE SULFATE AND AMPHETAMINE SULFATE 3.75; 3.75; 3.75; 3.75 MG/1; MG/1; MG/1; MG/1
15 CAPSULE, EXTENDED RELEASE ORAL DAILY
Qty: 30 CAPSULE | Refills: 0 | Status: CANCELLED | OUTPATIENT
Start: 2024-03-10 | End: 2024-04-09

## 2024-03-10 NOTE — TELEPHONE ENCOUNTER
Lou LOU Em Triage Support (supporting Amaris Sadler, APRN)5 days ago     Hi following up again as I don’t have any refills on my adderal prescription. Is it possible to get additional 30 day refills so that I don’t have to continue to message yall every month? Thanks so much!

## 2024-03-11 RX ORDER — DEXTROAMPHETAMINE SACCHARATE, AMPHETAMINE ASPARTATE MONOHYDRATE, DEXTROAMPHETAMINE SULFATE AND AMPHETAMINE SULFATE 3.75; 3.75; 3.75; 3.75 MG/1; MG/1; MG/1; MG/1
15 CAPSULE, EXTENDED RELEASE ORAL DAILY
Qty: 30 CAPSULE | Refills: 0 | OUTPATIENT
Start: 2024-03-11 | End: 2024-04-10

## 2024-03-11 RX ORDER — DEXTROAMPHETAMINE SACCHARATE, AMPHETAMINE ASPARTATE MONOHYDRATE, DEXTROAMPHETAMINE SULFATE AND AMPHETAMINE SULFATE 3.75; 3.75; 3.75; 3.75 MG/1; MG/1; MG/1; MG/1
15 CAPSULE, EXTENDED RELEASE ORAL DAILY
Qty: 30 CAPSULE | Refills: 0 | OUTPATIENT
Start: 2024-05-12 | End: 2024-06-11

## 2024-03-11 RX ORDER — DEXTROAMPHETAMINE SACCHARATE, AMPHETAMINE ASPARTATE MONOHYDRATE, DEXTROAMPHETAMINE SULFATE AND AMPHETAMINE SULFATE 3.75; 3.75; 3.75; 3.75 MG/1; MG/1; MG/1; MG/1
15 CAPSULE, EXTENDED RELEASE ORAL DAILY
Qty: 30 CAPSULE | Refills: 0 | OUTPATIENT
Start: 2024-04-11 | End: 2024-05-11

## 2024-03-11 NOTE — TELEPHONE ENCOUNTER
Please review; protocol failed/No Protocol    Patient asking for advanced refills- pended a 90 day panel-please review if appropriate?    Fill dates: 12/19/2023, 01/23/2024    Requested Prescriptions   Pending Prescriptions Disp Refills    Amphetamine-Dextroamphet ER (ADDERALL XR) 15 MG Oral Capsule SR 24 Hr 30 capsule 0     Sig: Take 1 capsule (15 mg total) by mouth daily.       Controlled Substance Medication Failed - 3/11/2024  4:25 PM        Failed - This medication is a controlled substance - forward to provider to refill          Amphetamine-Dextroamphet ER (ADDERALL XR) 15 MG Oral Capsule SR 24 Hr 30 capsule 0     Sig: Take 1 capsule (15 mg total) by mouth daily. Fill prescription in 30 days.       Controlled Substance Medication Failed - 3/11/2024  4:25 PM        Failed - This medication is a controlled substance - forward to provider to refill          Amphetamine-Dextroamphet ER (ADDERALL XR) 15 MG Oral Capsule SR 24 Hr 30 capsule 0     Sig: Take 1 capsule (15 mg total) by mouth daily. Fill prescription in 61 days.       Controlled Substance Medication Failed - 3/11/2024  4:25 PM        Failed - This medication is a controlled substance - forward to provider to refill           Future Appointments         Provider Department Appt Notes    In 1 week [facility] - OB/GYN Vaginal swab (see result note)    In 11 months Jamilah Ford MD [address] annual          Recent Outpatient Visits              3 weeks ago Encounter for well woman exam with routine gynecological exam    [address] Jamilah Ford MD    Office Visit    5 months [facility]    [facility] Visit    6 months ago [facility]     Office Visit    7 months ago Acne vulgaris    [facility], [place] Marcio Shook PA-C    Office Visit    7 months ago Attention and concentration deficit    [facility], [facility], Amaris Membreno APRN    Office Visit

## 2024-03-18 ENCOUNTER — TELEPHONE (OUTPATIENT)
Dept: INTERNAL MEDICINE CLINIC | Facility: CLINIC | Age: 34
End: 2024-03-18

## 2024-03-18 ENCOUNTER — OFFICE VISIT (OUTPATIENT)
Dept: OBGYN CLINIC | Facility: CLINIC | Age: 34
End: 2024-03-18
Payer: COMMERCIAL

## 2024-03-18 ENCOUNTER — OFFICE VISIT (OUTPATIENT)
Dept: INTERNAL MEDICINE CLINIC | Facility: CLINIC | Age: 34
End: 2024-03-18

## 2024-03-18 VITALS
HEIGHT: 63 IN | HEART RATE: 58 BPM | WEIGHT: 129 LBS | DIASTOLIC BLOOD PRESSURE: 71 MMHG | BODY MASS INDEX: 22.86 KG/M2 | SYSTOLIC BLOOD PRESSURE: 108 MMHG

## 2024-03-18 VITALS — BODY MASS INDEX: 23 KG/M2 | DIASTOLIC BLOOD PRESSURE: 71 MMHG | SYSTOLIC BLOOD PRESSURE: 110 MMHG | WEIGHT: 129.81 LBS

## 2024-03-18 DIAGNOSIS — F90.2 ATTENTION DEFICIT HYPERACTIVITY DISORDER (ADHD), COMBINED TYPE: ICD-10-CM

## 2024-03-18 DIAGNOSIS — E53.9 VITAMIN B DEFICIENCY: ICD-10-CM

## 2024-03-18 DIAGNOSIS — N76.0 VAGINITIS AND VULVOVAGINITIS: Primary | ICD-10-CM

## 2024-03-18 DIAGNOSIS — Z00.00 ANNUAL PHYSICAL EXAM: ICD-10-CM

## 2024-03-18 DIAGNOSIS — R21 RASH OF BOTH HANDS: Primary | ICD-10-CM

## 2024-03-18 DIAGNOSIS — E55.9 VITAMIN D DEFICIENCY: ICD-10-CM

## 2024-03-18 DIAGNOSIS — Z11.3 SCREEN FOR STD (SEXUALLY TRANSMITTED DISEASE): ICD-10-CM

## 2024-03-18 PROCEDURE — 3008F BODY MASS INDEX DOCD: CPT | Performed by: NURSE PRACTITIONER

## 2024-03-18 PROCEDURE — 3074F SYST BP LT 130 MM HG: CPT | Performed by: OBSTETRICS & GYNECOLOGY

## 2024-03-18 PROCEDURE — 3078F DIAST BP <80 MM HG: CPT | Performed by: NURSE PRACTITIONER

## 2024-03-18 PROCEDURE — 3078F DIAST BP <80 MM HG: CPT | Performed by: OBSTETRICS & GYNECOLOGY

## 2024-03-18 PROCEDURE — 99213 OFFICE O/P EST LOW 20 MIN: CPT | Performed by: OBSTETRICS & GYNECOLOGY

## 2024-03-18 PROCEDURE — 3074F SYST BP LT 130 MM HG: CPT | Performed by: NURSE PRACTITIONER

## 2024-03-18 PROCEDURE — 99395 PREV VISIT EST AGE 18-39: CPT | Performed by: NURSE PRACTITIONER

## 2024-03-18 RX ORDER — DEXTROAMPHETAMINE SACCHARATE, AMPHETAMINE ASPARTATE, DEXTROAMPHETAMINE SULFATE AND AMPHETAMINE SULFATE 2.5; 2.5; 2.5; 2.5 MG/1; MG/1; MG/1; MG/1
10 TABLET ORAL DAILY
Qty: 30 TABLET | Refills: 0 | Status: CANCELLED | OUTPATIENT
Start: 2024-03-18

## 2024-03-18 RX ORDER — DEXTROAMPHETAMINE SACCHARATE, AMPHETAMINE ASPARTATE, DEXTROAMPHETAMINE SULFATE AND AMPHETAMINE SULFATE 3.75; 3.75; 3.75; 3.75 MG/1; MG/1; MG/1; MG/1
TABLET ORAL DAILY
COMMUNITY
End: 2024-03-18

## 2024-03-18 RX ORDER — DEXTROAMPHETAMINE SACCHARATE, AMPHETAMINE ASPARTATE, DEXTROAMPHETAMINE SULFATE AND AMPHETAMINE SULFATE 3.75; 3.75; 3.75; 3.75 MG/1; MG/1; MG/1; MG/1
15 TABLET ORAL DAILY
Refills: 0 | Status: CANCELLED | OUTPATIENT
Start: 2024-03-18

## 2024-03-18 RX ORDER — DEXTROAMPHETAMINE SACCHARATE, AMPHETAMINE ASPARTATE, DEXTROAMPHETAMINE SULFATE AND AMPHETAMINE SULFATE 2.5; 2.5; 2.5; 2.5 MG/1; MG/1; MG/1; MG/1
10 TABLET ORAL DAILY
Qty: 90 TABLET | Refills: 0 | Status: SHIPPED | OUTPATIENT
Start: 2024-03-18 | End: 2024-03-18

## 2024-03-18 NOTE — ASSESSMENT & PLAN NOTE
Hx of ADHD- Controlled.       Plan  Would like to decrease her medication.    Decrease to 10mg.  amphetamine-dextroamphetamine 10 MG Oral Tab          Take 1 tablet (10 mg total) by mouth daily., Normal, Disp-90 tablet, R-0

## 2024-03-18 NOTE — TELEPHONE ENCOUNTER
Malini Masterson pharmacist indicated that received Rx today for generic adderall. Need to verify quantity.  Can not dispense 90 days supply if taking one tablet daily since controlled medication, would need to do 3 separate scripts for 30. Please advise.

## 2024-03-18 NOTE — ASSESSMENT & PLAN NOTE
Normal exam.  Labs as ordered.  Skin check normal.  No significant abnormal nevi. Rash on hands- very small red dots.  Breast exam -recent by GYNE  No cervical or inguinal lymphadenopathy.  Hernial orifices intact.    Immunizations- Up to date.    Will add food allergy panel to her labs.  Advise if dots continue to see Dermatology.

## 2024-03-18 NOTE — PROGRESS NOTES
HPI:    Patient ID: Lou Mathur is a 34 year old female.  LMP 2/19/2024.  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  Follows with [facility].  HPI Physical Exam  34 year old female is here for physical exam.  She is following up on her ADHD, anxiety, and night sweats.  Dr. Fajardo noted that she had a thyroid antibody positive test.    She is feeling good.  She works out regularly.      Immunization History   Administered Date(s) Administered    Covid-19 Vaccine Pfizer 30 mcg/0.3 ml 03/23/2021, 04/09/2021    HEP A/HEP B Combined 11/13/2018    Influenza 10/01/2019, 10/20/2022    MMR 11/13/2018    TDAP 02/02/2017, 06/02/2020       No past medical history on file.   No past surgical history on file.   Social History     Socioeconomic History    Marital status:    Tobacco Use    Smoking status: Never     Passive exposure: Never    Smokeless tobacco: Never   Vaping Use    Vaping Use: Never used   Substance and Sexual Activity    Alcohol use: Yes     Comment: Rare    Drug use: Never   Other Topics Concern    Reaction to local anesthetic No    Pt has a pacemaker No    Pt has a defibrillator No          Review of Systems   Constitutional:  Negative for chills, fatigue and fever.   HENT:  Negative for ear pain, hearing loss, sinus pain, sore throat and trouble swallowing.    Eyes:  Negative for pain and visual disturbance.   Respiratory:  Negative for cough, chest tightness and shortness of breath.    Cardiovascular:  Negative for chest pain, palpitations and leg swelling.   Gastrointestinal:  Negative for abdominal pain, constipation, diarrhea, nausea and vomiting.   Endocrine: Negative for cold intolerance and heat intolerance.   Genitourinary:  Negative for dysuria and hematuria.   Musculoskeletal:  Negative for back pain and joint swelling.   Skin:  Negative for rash.   Allergic/Immunologic: Negative for environmental allergies and food allergies.   Neurological:  Negative for weakness, numbness and  headaches.   Hematological:  Does not bruise/bleed easily.   Psychiatric/Behavioral:  Negative for dysphoric mood and sleep disturbance. The patient is nervous/anxious.               Current Outpatient Medications   Medication Sig Dispense Refill    amphetamine-dextroamphetamine 10 MG Oral Tab Take 1 tablet (10 mg total) by mouth daily. 90 tablet 0    Magnesium 200 MG Oral Tab Take by mouth.      Clindamycin Phos-Benzoyl Perox 1-5 % External Gel Apply daily as tolerated 50 g 3    tretinoin 0.025 % External Cream Apply 1 Application topically nightly. Use as tolerated 30 g 3    Multiple Vitamin (ONE-DAILY MULTI VITAMINS) Oral Tab Take 1 tablet by mouth daily.      ketoconazole 2 % External Shampoo Apply to scalp 2 times per week. 120 mL 11    busPIRone 15 MG Oral Tab        Allergies:  Allergies   Allergen Reactions    Sulfa Antibiotics ANAPHYLAXIS and SWELLING     Throat closing      PHYSICAL EXAM:   Physical Exam  Constitutional:       Appearance: Normal appearance. She is well-developed.   HENT:      Head: Normocephalic.      Right Ear: Tympanic membrane normal.      Left Ear: Tympanic membrane normal.      Nose: Nose normal.      Mouth/Throat:      Mouth: Mucous membranes are moist.      Pharynx: No oropharyngeal exudate or posterior oropharyngeal erythema.   Eyes:      General:         Right eye: No discharge.         Left eye: No discharge.      Pupils: Pupils are equal, round, and reactive to light.   Cardiovascular:      Rate and Rhythm: Normal rate and regular rhythm.      Heart sounds: Normal heart sounds. No murmur heard.     No friction rub. No gallop.   Pulmonary:      Effort: Pulmonary effort is normal. No respiratory distress.      Breath sounds: Normal breath sounds. No wheezing, rhonchi or rales.   Abdominal:      General: Bowel sounds are normal. There is no distension.      Palpations: Abdomen is soft. There is no mass.      Tenderness: There is no abdominal tenderness. There is no right CVA  tenderness, left CVA tenderness or guarding.   Musculoskeletal:         General: No tenderness.      Cervical back: Normal range of motion and neck supple. No tenderness.      Right lower leg: No edema.      Left lower leg: No edema.   Lymphadenopathy:      Cervical: No cervical adenopathy.   Skin:     General: Skin is warm and dry.      Findings: No rash.   Neurological:      Mental Status: She is alert and oriented to person, place, and time.      Coordination: Coordination normal.      Gait: Gait normal.   Psychiatric:         Mood and Affect: Mood normal.         Behavior: Behavior normal.         Thought Content: Thought content normal.         Judgment: Judgment normal.       /71 (BP Location: Right arm, Patient Position: Sitting, Cuff Size: adult)   Pulse 58   Ht 5' 3\" (1.6 m)   Wt 129 lb (58.5 kg)   LMP 02/19/2024 (Exact Date)   BMI 22.85 kg/m²   Wt Readings from Last 2 Encounters:   03/18/24 129 lb (58.5 kg)   02/15/24 127 lb 3.3 oz (57.7 kg)     Body mass index is 22.85 kg/m².(2)  Lab Results   Component Value Date    WBC 5.7 02/01/2023    RBC 4.90 02/01/2023    HGB 15.0 02/01/2023    HCT 44.9 02/01/2023    MCV 91.6 02/01/2023    MCH 30.6 02/01/2023    MCHC 33.4 02/01/2023    RDW 12.6 02/01/2023    .0 02/01/2023      Lab Results   Component Value Date    GLU 82 02/01/2023    BUN 13 02/01/2023    BUNCREA 14.1 02/01/2023    CREATSERUM 0.92 02/01/2023    ANIONGAP 5 02/01/2023    CA 9.9 02/01/2023    OSMOCALC 289 02/01/2023    ALKPHO 53 02/01/2023    AST 24 02/01/2023    ALT 24 02/01/2023    BILT 0.7 02/01/2023    TP 8.0 02/01/2023    ALB 4.3 02/01/2023    GLOBULIN 3.7 02/01/2023     02/01/2023    K 4.6 02/01/2023     02/01/2023    CO2 29.0 02/01/2023      No results found for: \"EAG\", \"A1C\"   Lab Results   Component Value Date    CHOLEST 169 02/01/2023    TRIG 48 02/01/2023    HDL 68 (H) 02/01/2023    LDL 91 02/01/2023    VLDL 8 02/01/2023    NONHDLC 101 02/01/2023      Lab  Results   Component Value Date    T4F 1.0 02/15/2024    TSH 1.640 02/15/2024                ASSESSMENT/PLAN:     Problem List Items Addressed This Visit       ADHD     Hx of ADHD- Controlled.     Plan  Would like to decrease her medication.    Decrease to 10mg.  amphetamine-dextroamphetamine 10 MG Oral Tab          Take 1 tablet (10 mg total) by mouth daily., Normal, Disp-90 tablet, R-0          Relevant Medications    amphetamine-dextroamphetamine 10 MG Oral Tab    Annual physical exam     Normal exam.  Labs as ordered.  Skin check normal.  No significant abnormal nevi. Rash on hands- very small red dots.  Breast exam -recent by [facility]  No cervical or inguinal lymphadenopathy.  Hernial orifices intact.    Immunizations- Up to date.    Will add food allergy panel to her labs.  Advise if dots continue to see Dermatology.         Relevant Orders    Comp Metabolic Panel (14)    Lipid Panel    Vitamin D, 25-Hydroxy    Vitamin B12    Urinalysis, Routine    CBC, NO DIFFERENTIAL/PLATELET     Other Visit Diagnoses       Rash of both hands    -  Primary    Relevant Orders    Adult Food Allergy Prof [E]    Vitamin D deficiency        Relevant Orders    Vitamin D, 25-Hydroxy    Vitamin B deficiency        Relevant Orders    Vitamin B12               Orders Placed This Encounter   Procedures    Adult Food Allergy Prof [E]    Comp Metabolic Panel (14)    Lipid Panel    Vitamin D, 25-Hydroxy    Vitamin B12    Urinalysis, Routine    CBC, NO DIFFERENTIAL/PLATELET       Meds This Visit:  Requested Prescriptions     Signed Prescriptions Disp Refills    amphetamine-dextroamphetamine 10 MG Oral Tab 90 tablet 0     Sig: Take 1 tablet (10 mg total) by mouth daily.       Imaging & Referrals:  None         CARLENE Soto

## 2024-03-18 NOTE — PROGRESS NOTES
Chief Complaint   Patient presents with    Vaginal Problem         Lou Mathur is a 34 year old female who presents for Vaginal concerns  .    LMP: 24.    Menses regular: 28-30d.    Menstrual flow normal: light to moderate.    Birth control or HRT: none.   Refill none  Last Pap Smear: 24 (normal) . Any history of abnormal paps: none   Gardasil:(age [age] y/o) .   Any medication refills needed today?: none    Problems/concerns: vaginal swab.            Immunization History   Administered Date(s) Administered    Covid-19 Vaccine Pfizer 30 mcg/0.3 ml 2021, 2021    HEP A/HEP B Combined 2018    Influenza 10/01/2019, 10/20/2022    MMR 2018    TDAP 2017, 2020         Current Outpatient Medications:     amphetamine-dextroamphetamine (ADDERALL) 10 MG Oral Tab, Take 1 tablet (10 mg total) by mouth daily., Disp: 30 tablet, Rfl: 0    [START ON 2024] amphetamine-dextroamphetamine (ADDERALL) 10 MG Oral Tab, Take 1 tablet (10 mg total) by mouth daily., Disp: 30 tablet, Rfl: 0    [START ON 2024] amphetamine-dextroamphetamine (ADDERALL) 10 MG Oral Tab, Take 1 tablet (10 mg total) by mouth daily., Disp: 30 tablet, Rfl: 0    Magnesium 200 MG Oral Tab, Take by mouth., Disp: , Rfl:     Clindamycin Phos-Benzoyl Perox 1-5 % External Gel, Apply daily as tolerated, Disp: 50 g, Rfl: 3    tretinoin 0.025 % External Cream, Apply 1 Application topically nightly. Use as tolerated, Disp: 30 g, Rfl: 3    Multiple Vitamin (ONE-DAILY MULTI VITAMINS) Oral Tab, Take 1 tablet by mouth daily., Disp: , Rfl:     ketoconazole 2 % External Shampoo, Apply to scalp 2 times per week., Disp: 120 mL, Rfl: 11    busPIRone 15 MG Oral Tab, , Disp: , Rfl:     Allergies   Allergen Reactions    Sulfa Antibiotics ANAPHYLAXIS and SWELLING     Throat closing       OB History    Para Term  AB Living   2 2 2 0 0 2   SAB IAB Ectopic Multiple Live Births   0 0 0 0 2      # Outcome Date GA Lbr  Franky/2nd Weight Sex Delivery Anes PTL Lv   2 Term 07/18/22 37w0d  6 lb 15 oz F NORMAL SPONT EPI  JEANE   1 Term 08/23/20 40w1d / 00:45 7 lb 11.5 oz M Vag-Spont EPI N JEANE      Name: STEPHIE AMAYA      Apgar1: 8  Apgar5: 9       No past medical history on file.    No past surgical history on file.    Family History   Problem Relation Age of Onset    Hyperlipidemia Father     Hypertension Mother     Hypertension Maternal Grandmother     Hypertension Maternal Grandfather     Hypertension Paternal Grandmother     Hypertension Paternal Grandfather                Social History     Socioeconomic History    Marital status:      Spouse name: Not on file    Number of children: Not on file    Years of education: Not on file    Highest education level: Not on file   Occupational History    Not on file   Tobacco Use    Smoking status: Never     Passive exposure: Never    Smokeless tobacco: Never   Vaping Use    Vaping Use: Never used   Substance and Sexual Activity    Alcohol use: Yes     Comment: Rare    Drug use: Never    Sexual activity: Not on file   Other Topics Concern    Grew up on a farm Not Asked    History of tanning Not Asked    Outdoor occupation Not Asked    Breast feeding Not Asked    Reaction to local anesthetic No    Pt has a pacemaker No    Pt has a defibrillator No   Social History Narrative    Not on file     Social Determinants of Health     Financial Resource Strain: Not on file   Food Insecurity: Not on file   Transportation Needs: Not on file   Physical Activity: Not on file   Stress: Not on file   Social Connections: Not on file   Housing Stability: Not on file     /71   Wt 129 lb 12.8 oz (58.9 kg)   LMP 02/19/2024 (Exact Date)     Wt Readings from Last 3 Encounters:   03/18/24 129 lb 12.8 oz (58.9 kg)   03/18/24 129 lb (58.5 kg)   02/15/24 127 lb 3.3 oz (57.7 kg)       Health Maintenance   Topic Date Due    COVID-19 Vaccine (3 - 2023-24 season) 09/01/2023    Influenza Vaccine (1)  10/01/2023    Annual Physical  03/18/2025    Pap Smear  02/15/2027    DTaP,Tdap,and Td Vaccines (3 - Td or Tdap) 06/02/2030    Annual Depression Screening  Completed    Pneumococcal Vaccine: Birth to 64yrs  Aged Out         Review of Systems   General: Present- Feeling well. Not Present- Fever.  Female Genitourinary: Not Present- Dysmenorrhea, Dyspareunia, Flank Pain, Frequency, Menstrual Irregularities, Pelvic Pain, Urgency, Urinary Complaints, Vaginal Bleeding and Vaginal dryness.  Pain: Present- Pain Rating - 0 on a 0-10 scale.  All other systems negative       Physical Exam   The physical exam findings are as follows:     Abdomen   Inspection: - Inspection Normal.  Palpation/Percussion: Palpation and Percussion of the abdomen reveal - Non Tender, No hepatosplenomegaly and No Palpable abdominal masses.    Female Genitourinary     External Genitalia   Perineum - Normal. Bartholin's Gland - Bilateral - Normal. Clitoris - Normal.  Introitus: Characteristics - Normal. Discharge - None.  Labia Majora: Lesions - Bilateral - None. Characteristics - Bilateral - Normal.  Labia Minora: Lesions - Bilateral - None. Characteristics - Bilateral - Normal.  Urethra: Characteristics - Normal. Discharge - None.  Rollins Gland - Bilateral - Normal.  Vulva: Characteristics - Normal. Lesions - None.    Speculum & Bimanual   Vagina:   Vaginal Wall: - Normal.  Vaginal Lesions - None. Vaginal Mucosa - Normal.  Cervix: Characteristics - No Motion tenderness. Discharge - None.  Uterus: Characteristics - Non Tender. Position - Midposition.  Adnexa: Characteristics - Bilateral - Tender. Masses - No Adnexal Masses.  Bladder - Normal.      Rectal   Anorectal Exam: External - normal external exam.    Lymphatic  General Lymphatics   Description - Normal .    1. Vaginitis and vulvovaginitis

## 2024-03-19 LAB
BV BACTERIA DNA VAG QL NAA+PROBE: NEGATIVE
C GLABRATA DNA VAG QL NAA+PROBE: NEGATIVE
C KRUSEI DNA VAG QL NAA+PROBE: NEGATIVE
C TRACH DNA SPEC QL NAA+PROBE: NEGATIVE
CANDIDA DNA VAG QL NAA+PROBE: NEGATIVE
N GONORRHOEA DNA SPEC QL NAA+PROBE: NEGATIVE
T VAGINALIS DNA VAG QL NAA+PROBE: NEGATIVE

## 2024-04-04 ENCOUNTER — APPOINTMENT (OUTPATIENT)
Dept: URBAN - METROPOLITAN AREA CLINIC 248 | Age: 34
Setting detail: DERMATOLOGY
End: 2024-04-04

## 2024-04-04 DIAGNOSIS — Z41.1 ENCOUNTER FOR COSMETIC SURGERY: ICD-10-CM

## 2024-04-04 PROCEDURE — OTHER CONSULTATION - BREAST (COSMETIC): OTHER

## 2024-10-18 ENCOUNTER — OFFICE VISIT (OUTPATIENT)
Dept: DERMATOLOGY CLINIC | Facility: CLINIC | Age: 34
End: 2024-10-18

## 2024-10-18 DIAGNOSIS — L70.0 ACNE VULGARIS: Primary | ICD-10-CM

## 2024-10-18 PROCEDURE — 99213 OFFICE O/P EST LOW 20 MIN: CPT | Performed by: PHYSICIAN ASSISTANT

## 2024-10-18 RX ORDER — TRETINOIN 0.5 MG/G
CREAM TOPICAL
Qty: 20 G | Refills: 5 | Status: SHIPPED | OUTPATIENT
Start: 2024-10-18

## 2024-10-18 RX ORDER — DEXTROAMPHETAMINE SACCHARATE, AMPHETAMINE ASPARTATE MONOHYDRATE, DEXTROAMPHETAMINE SULFATE AND AMPHETAMINE SULFATE 5; 5; 5; 5 MG/1; MG/1; MG/1; MG/1
20 CAPSULE, EXTENDED RELEASE ORAL DAILY
COMMUNITY
Start: 2024-10-05

## 2024-10-18 NOTE — PROGRESS NOTES
HPI:    Patient ID: Lou Mathur is a 34 year old female.    Patient presents for follow-up on acne. Using tretinoin cream with great improvement. Patient requesting higher percentage at this time. No draining or tenderness noted. Hx of allergies to medications noted. Doing well with the tretinoin. Has noted clearance on her face and back.         Review of Systems   Constitutional:  Negative for chills and fever.   Musculoskeletal:  Negative for arthralgias and myalgias.   Skin:  Positive for rash. Negative for color change and wound.            Current Outpatient Medications   Medication Sig Dispense Refill    Amphetamine-Dextroamphet ER 20 MG Oral Capsule SR 24 Hr Take 1 capsule (20 mg total) by mouth daily.      Magnesium 200 MG Oral Tab Take by mouth.      Clindamycin Phos-Benzoyl Perox 1-5 % External Gel Apply daily as tolerated 50 g 3    Multiple Vitamin (ONE-DAILY MULTI VITAMINS) Oral Tab Take 1 tablet by mouth daily.      Tretinoin 0.05 % External Cream Apply to face nightly as tolerated 20 g 5    ketoconazole 2 % External Shampoo Apply to scalp 2 times per week. (Patient not taking: Reported on 10/18/2024) 120 mL 11    busPIRone 15 MG Oral Tab  (Patient not taking: Reported on 10/18/2024)       Allergies:Allergies[1]   LMP 02/19/2024 (Exact Date)   There is no height or weight on file to calculate BMI.  PHYSICAL EXAM:   Physical Exam  Constitutional:       General: She is not in acute distress.     Appearance: Normal appearance.   Skin:     General: Skin is warm and dry.      Findings: Rash present.      Comments: Back and face with minimal scarring and papules. No draining or tenderness noted. No scaling noted. No erythema noted.    Neurological:      Mental Status: She is alert and oriented to person, place, and time.                ASSESSMENT/PLAN:   1. Acne vulgaris  -After discussion with patient, advised the following:  -Continue with tretinoin   -Use at night  -Use as tolerated.   -Return  yearly.   -To call or follow-up with worsening symptoms or concerns.   -Pt was agreeable to plan and will comply with discussion above.         No orders of the defined types were placed in this encounter.      Meds This Visit:  Requested Prescriptions     Signed Prescriptions Disp Refills    Tretinoin 0.05 % External Cream 20 g 5     Sig: Apply to face nightly as tolerated       Imaging & Referrals:  None         ID#[id number]       [1]   Allergies  Allergen Reactions    Sulfa Antibiotics ANAPHYLAXIS and SWELLING     Throat closing

## 2025-02-07 ENCOUNTER — LAB ENCOUNTER (OUTPATIENT)
Dept: LAB | Age: 35
End: 2025-02-07
Attending: NURSE PRACTITIONER
Payer: COMMERCIAL

## 2025-02-07 DIAGNOSIS — Z00.00 ANNUAL PHYSICAL EXAM: ICD-10-CM

## 2025-02-07 DIAGNOSIS — E53.9 VITAMIN B DEFICIENCY: ICD-10-CM

## 2025-02-07 DIAGNOSIS — E55.9 VITAMIN D DEFICIENCY: ICD-10-CM

## 2025-02-07 DIAGNOSIS — R21 RASH OF BOTH HANDS: ICD-10-CM

## 2025-02-07 LAB
ALBUMIN SERPL-MCNC: 4.9 G/DL (ref 3.2–4.8)
ALBUMIN/GLOB SERPL: 1.7 {RATIO} (ref 1–2)
ALP LIVER SERPL-CCNC: 41 U/L
ALT SERPL-CCNC: 10 U/L
ANION GAP SERPL CALC-SCNC: 8 MMOL/L (ref 0–18)
AST SERPL-CCNC: 18 U/L (ref ?–34)
BILIRUB SERPL-MCNC: 1.1 MG/DL (ref 0.3–1.2)
BILIRUB UR QL: NEGATIVE
BUN BLD-MCNC: 17 MG/DL (ref 9–23)
BUN/CREAT SERPL: 16.8 (ref 10–20)
CALCIUM BLD-MCNC: 9.5 MG/DL (ref 8.7–10.4)
CHLORIDE SERPL-SCNC: 105 MMOL/L (ref 98–112)
CHOLEST SERPL-MCNC: 167 MG/DL (ref ?–200)
CLARITY UR: CLEAR
CO2 SERPL-SCNC: 28 MMOL/L (ref 21–32)
COLOR UR: YELLOW
CREAT BLD-MCNC: 1.01 MG/DL
DEPRECATED RDW RBC AUTO: 42.1 FL (ref 35.1–46.3)
EGFRCR SERPLBLD CKD-EPI 2021: 74 ML/MIN/1.73M2 (ref 60–?)
ERYTHROCYTE [DISTWIDTH] IN BLOOD BY AUTOMATED COUNT: 12.7 % (ref 11–15)
FASTING PATIENT LIPID ANSWER: YES
FASTING STATUS PATIENT QL REPORTED: YES
GLOBULIN PLAS-MCNC: 2.9 G/DL (ref 2–3.5)
GLUCOSE BLD-MCNC: 89 MG/DL (ref 70–99)
GLUCOSE UR-MCNC: NORMAL MG/DL
HCT VFR BLD AUTO: 44.5 %
HDLC SERPL-MCNC: 62 MG/DL (ref 40–59)
HGB BLD-MCNC: 15 G/DL
HGB UR QL STRIP.AUTO: NEGATIVE
KETONES UR-MCNC: NEGATIVE MG/DL
LDLC SERPL CALC-MCNC: 91 MG/DL (ref ?–100)
LEUKOCYTE ESTERASE UR QL STRIP.AUTO: NEGATIVE
MCH RBC QN AUTO: 30.4 PG (ref 26–34)
MCHC RBC AUTO-ENTMCNC: 33.7 G/DL (ref 31–37)
MCV RBC AUTO: 90.3 FL
NITRITE UR QL STRIP.AUTO: NEGATIVE
NONHDLC SERPL-MCNC: 105 MG/DL (ref ?–130)
OSMOLALITY SERPL CALC.SUM OF ELEC: 293 MOSM/KG (ref 275–295)
PH UR: 6 [PH] (ref 5–8)
PLATELET # BLD AUTO: 268 10(3)UL (ref 150–450)
POTASSIUM SERPL-SCNC: 4.7 MMOL/L (ref 3.5–5.1)
PROT SERPL-MCNC: 7.8 G/DL (ref 5.7–8.2)
RBC # BLD AUTO: 4.93 X10(6)UL
SODIUM SERPL-SCNC: 141 MMOL/L (ref 136–145)
SP GR UR STRIP: 1.03 (ref 1–1.03)
TRIGL SERPL-MCNC: 71 MG/DL (ref 30–149)
UROBILINOGEN UR STRIP-ACNC: NORMAL
VIT B12 SERPL-MCNC: 1170 PG/ML (ref 211–911)
VIT D+METAB SERPL-MCNC: 27.6 NG/ML (ref 30–100)
VLDLC SERPL CALC-MCNC: 11 MG/DL (ref 0–30)
WBC # BLD AUTO: 7.6 X10(3) UL (ref 4–11)

## 2025-02-07 PROCEDURE — 85027 COMPLETE CBC AUTOMATED: CPT

## 2025-02-07 PROCEDURE — 86003 ALLG SPEC IGE CRUDE XTRC EA: CPT

## 2025-02-07 PROCEDURE — 81003 URINALYSIS AUTO W/O SCOPE: CPT

## 2025-02-07 PROCEDURE — 82306 VITAMIN D 25 HYDROXY: CPT

## 2025-02-07 PROCEDURE — 82607 VITAMIN B-12: CPT

## 2025-02-07 PROCEDURE — 80053 COMPREHEN METABOLIC PANEL: CPT

## 2025-02-07 PROCEDURE — 36415 COLL VENOUS BLD VENIPUNCTURE: CPT

## 2025-02-07 PROCEDURE — 80061 LIPID PANEL: CPT

## 2025-02-07 PROCEDURE — 82785 ASSAY OF IGE: CPT

## 2025-02-10 LAB
ALLERGEN BRAZIL NUT: <0.1 KUA/L (ref ?–0.1)
ALMOND IGE QN: <0.1 KUA/L (ref ?–0.1)
CASHEW NUT IGE QN: <0.1 KUA/L (ref ?–0.1)
CLAM IGE QN: <0.1 KUA/L (ref ?–0.1)
CODFISH IGE QN: <0.1 KUA/L (ref ?–0.1)
CORN IGE QN: <0.1 KUA/L (ref ?–0.1)
COW MILK IGE QN: <0.1 KUA/L (ref ?–0.1)
EGG WHITE IGE QN: <0.1 KUA/L (ref ?–0.1)
GLUTEN IGE QN: <0.1 KUA/L (ref ?–0.1)
HAZELNUT IGE QN: <0.1 KUA/L (ref ?–0.1)
IGE SERPL-ACNC: 13.9 KU/L (ref 2–214)
PEANUT IGE QN: <0.1 KUA/L (ref ?–0.1)
SALMON IGE QN: <0.1 KUA/L (ref ?–0.1)
SCALLOP IGE QN: <0.1 KUA/L (ref ?–0.1)
SESAME SEED IGE QN: <0.1 KUA/L (ref ?–0.1)
SHRIMP IGE QN: <0.1 KUA/L (ref ?–0.1)
SOYBEAN IGE QN: <0.1 KUA/L (ref ?–0.1)
WALNUT IGE QN: <0.1 KUA/L (ref ?–0.1)
WHEAT IGE QN: <0.1 KUA/L (ref ?–0.1)

## 2025-07-17 ENCOUNTER — OFFICE VISIT (OUTPATIENT)
Dept: INTERNAL MEDICINE CLINIC | Facility: CLINIC | Age: 35
End: 2025-07-17

## 2025-07-17 ENCOUNTER — LAB ENCOUNTER (OUTPATIENT)
Dept: LAB | Age: 35
End: 2025-07-17
Attending: INTERNAL MEDICINE
Payer: COMMERCIAL

## 2025-07-17 VITALS
WEIGHT: 123.38 LBS | BODY MASS INDEX: 21.86 KG/M2 | OXYGEN SATURATION: 98 % | DIASTOLIC BLOOD PRESSURE: 68 MMHG | HEIGHT: 63 IN | TEMPERATURE: 97 F | SYSTOLIC BLOOD PRESSURE: 99 MMHG | HEART RATE: 79 BPM

## 2025-07-17 DIAGNOSIS — Z00.00 PHYSICAL EXAM, ANNUAL: ICD-10-CM

## 2025-07-17 DIAGNOSIS — Z00.00 PHYSICAL EXAM, ANNUAL: Primary | ICD-10-CM

## 2025-07-17 LAB — TSI SER-ACNC: 1.17 UIU/ML (ref 0.55–4.78)

## 2025-07-17 PROCEDURE — 36415 COLL VENOUS BLD VENIPUNCTURE: CPT

## 2025-07-17 PROCEDURE — 84443 ASSAY THYROID STIM HORMONE: CPT

## 2025-07-17 NOTE — PROGRESS NOTES
Lou Mathur is a 35 year old female.  Chief Complaint   Patient presents with    Physical       HPI:   New Patient  C/C establish care - used to be seen at [facility]   C/o annual physical  Irregular cycles since age 19 -saw endo and gyn -wondering     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     Current Medications[1]   Past Medical History[2]   Past Surgical History[3]   Social History:  Short Social Hx on File[4]     REVIEW OF SYSTEMS:   GENERAL HEALTH: No fevers, chills, sweats, fatigue  VISION: No recent vision problems, blurry vision or double vision  HEENT: No decreased hearing ear pain nasal congestion or sore throat  SKIN: denies any unusual skin lesions or rashes  RESPIRATORY: denies shortness of breath, cough, wheezing  CARDIOVASCULAR: denies chest pain on exertion, palpitations, swelling in feet  GI: denies abdominal pain and denies heartburn, nausea or vomiting  : No Pain on urination, change in the color of urine, discharge, urinating frequently  MUS: No back pain, joint pain, muscle pain  NEURO: denies headaches , anxiety, depression--talks to therapist     EXAM:   BP 99/68   Pulse 79   Temp 97.1 °F (36.2 °C)   Ht 5' 3\" (1.6 m)   Wt 123 lb 6.4 oz (56 kg)   LMP 06/26/2025 (Approximate)   SpO2 98%   BMI 21.86 kg/m²   GENERAL: well developed, well nourished,in no apparent distress  SKIN: no rashes,no suspicious lesions  HEENT: atraumatic, normocephalic,ears and throat are clear, no frontal or maxillary sinus tenderness, pupils equal reactive to light bilaterally, extraocular muscles intact  NECK: supple,no adenopathy,nontender   LUNGS: clear to auscultation, no wheeze  CARDIO: RRR without murmur  GI: good BS's,no masses or tenderness  EXTREMITIES: no cyanosis, or edema    ASSESSMENT AND PLAN:   Diagnoses and all orders for this visit:    Physical exam, annual  -     TSH W  Reflex To Free T4; Future      Advised patient to watch what she eats and exercise, seatbelt use and no texting and  driving, sunscreen use advised        Preventative medicine   Pap-2/2024 dr megan prince   Labs 2/2025 3/2024 4/2023 3/2023        The patient indicates understanding of these issues and agrees to the plan.  No follow-ups on file.       [1]   Current Outpatient Medications   Medication Sig Dispense Refill    Amphetamine-Dextroamphet ER 20 MG Oral Capsule SR 24 Hr Take 1 capsule (20 mg total) by mouth daily.      Tretinoin 0.05 % External Cream Apply to face nightly as tolerated 20 g 5    Magnesium 200 MG Oral Tab Take by mouth.      Clindamycin Phos-Benzoyl Perox 1-5 % External Gel Apply daily as tolerated 50 g 3    Multiple Vitamin (ONE-DAILY MULTI VITAMINS) Oral Tab Take 1 tablet by mouth daily.     [2]   Past Medical History:   Anxiety   [3] History reviewed. No pertinent surgical history.  [4]   Social History  Socioeconomic History    Marital status:    Tobacco Use    Smoking status: Never     Passive exposure: Never    Smokeless tobacco: Never   Vaping Use    Vaping status: Never Used   Substance and Sexual Activity    Alcohol use: Yes     Comment: Rare    Drug use: Never   Other Topics Concern    Reaction to local anesthetic No    Pt has a pacemaker No    Pt has a defibrillator No